# Patient Record
Sex: FEMALE | Race: BLACK OR AFRICAN AMERICAN | Employment: UNEMPLOYED | URBAN - METROPOLITAN AREA
[De-identification: names, ages, dates, MRNs, and addresses within clinical notes are randomized per-mention and may not be internally consistent; named-entity substitution may affect disease eponyms.]

---

## 2018-06-21 ENCOUNTER — HOSPITAL ENCOUNTER (EMERGENCY)
Age: 53
Discharge: HOME | End: 2018-06-21
Attending: EMERGENCY MEDICINE
Payer: MEDICARE

## 2018-06-21 VITALS
BODY MASS INDEX: 22.15 KG/M2 | DIASTOLIC BLOOD PRESSURE: 95 MMHG | WEIGHT: 125 LBS | SYSTOLIC BLOOD PRESSURE: 156 MMHG | TEMPERATURE: 98.8 F | HEART RATE: 83 BPM | HEIGHT: 63 IN | OXYGEN SATURATION: 97 % | RESPIRATION RATE: 16 BRPM

## 2018-06-21 DIAGNOSIS — R51.9 ACUTE NONINTRACTABLE HEADACHE, UNSPECIFIED HEADACHE TYPE: Primary | ICD-10-CM

## 2018-06-21 LAB
-: NORMAL
AMORPHOUS: NORMAL
BACTERIA: NORMAL
BILIRUBIN URINE: NEGATIVE
CASTS UA: NORMAL /LPF (ref 0–2)
CHP ED QC CHECK: YES
COLOR: YELLOW
COMMENT UA: ABNORMAL
CRYSTALS, UA: NORMAL /HPF
EPITHELIAL CELLS UA: NORMAL /HPF (ref 0–5)
GLUCOSE BLD-MCNC: 86 MG/DL
GLUCOSE BLD-MCNC: 86 MG/DL (ref 65–105)
GLUCOSE URINE: NEGATIVE
KETONES, URINE: NEGATIVE
LEUKOCYTE ESTERASE, URINE: ABNORMAL
MUCUS: NORMAL
NITRITE, URINE: NEGATIVE
OTHER OBSERVATIONS UA: NORMAL
PH UA: 6.5 (ref 5–8)
PROTEIN UA: NEGATIVE
RBC UA: NORMAL /HPF (ref 0–2)
RENAL EPITHELIAL, UA: NORMAL /HPF
SPECIFIC GRAVITY UA: 1 (ref 1–1.03)
TRICHOMONAS: NORMAL
TURBIDITY: CLEAR
URINE HGB: NEGATIVE
UROBILINOGEN, URINE: NORMAL
WBC UA: NORMAL /HPF (ref 0–5)
YEAST: NORMAL

## 2018-06-21 PROCEDURE — 81001 URINALYSIS AUTO W/SCOPE: CPT

## 2018-06-21 PROCEDURE — G0382 LEV 3 HOSP TYPE B ED VISIT: HCPCS

## 2018-06-21 PROCEDURE — 82947 ASSAY GLUCOSE BLOOD QUANT: CPT

## 2018-06-21 PROCEDURE — 6370000000 HC RX 637 (ALT 250 FOR IP): Performed by: EMERGENCY MEDICINE

## 2018-06-21 RX ORDER — METRONIDAZOLE 500 MG/1
2000 TABLET ORAL ONCE
Status: DISCONTINUED | OUTPATIENT
Start: 2018-06-21 | End: 2018-06-21

## 2018-06-21 RX ORDER — BUTALBITAL, ACETAMINOPHEN AND CAFFEINE 300; 40; 50 MG/1; MG/1; MG/1
1 CAPSULE ORAL EVERY 6 HOURS PRN
Qty: 6 CAPSULE | Refills: 0 | Status: SHIPPED | OUTPATIENT
Start: 2018-06-21 | End: 2018-07-27 | Stop reason: SDUPTHER

## 2018-06-21 RX ORDER — IBUPROFEN 800 MG/1
800 TABLET ORAL ONCE
Status: COMPLETED | OUTPATIENT
Start: 2018-06-21 | End: 2018-06-21

## 2018-06-21 RX ORDER — IBUPROFEN 800 MG/1
800 TABLET ORAL EVERY 8 HOURS PRN
Qty: 12 TABLET | Refills: 0 | Status: SHIPPED | OUTPATIENT
Start: 2018-06-21 | End: 2018-07-27 | Stop reason: ALTCHOICE

## 2018-06-21 RX ADMIN — IBUPROFEN 800 MG: 800 TABLET ORAL at 17:41

## 2018-06-21 ASSESSMENT — ENCOUNTER SYMPTOMS
SORE THROAT: 0
DIARRHEA: 0
BACK PAIN: 0
SHORTNESS OF BREATH: 0
CONSTIPATION: 0
VOMITING: 0
RHINORRHEA: 0
COUGH: 0
BLOOD IN STOOL: 0
SINUS PRESSURE: 0
NAUSEA: 0
ABDOMINAL PAIN: 0
PHOTOPHOBIA: 0

## 2018-06-21 ASSESSMENT — PAIN SCALES - GENERAL: PAINLEVEL_OUTOF10: 6

## 2018-07-27 ENCOUNTER — OFFICE VISIT (OUTPATIENT)
Dept: INTERNAL MEDICINE | Age: 53
End: 2018-07-27
Payer: MEDICARE

## 2018-07-27 VITALS
BODY MASS INDEX: 21.09 KG/M2 | DIASTOLIC BLOOD PRESSURE: 92 MMHG | HEIGHT: 63 IN | SYSTOLIC BLOOD PRESSURE: 149 MMHG | HEART RATE: 81 BPM | WEIGHT: 119 LBS

## 2018-07-27 DIAGNOSIS — G89.29 CHRONIC LEFT SHOULDER PAIN: ICD-10-CM

## 2018-07-27 DIAGNOSIS — K30 NUD (NONULCER DYSPEPSIA): Primary | ICD-10-CM

## 2018-07-27 DIAGNOSIS — R16.0 HEPATOMEGALY: ICD-10-CM

## 2018-07-27 DIAGNOSIS — F10.10 ALCOHOL ABUSE: ICD-10-CM

## 2018-07-27 DIAGNOSIS — F51.01 PRIMARY INSOMNIA: ICD-10-CM

## 2018-07-27 DIAGNOSIS — F32.A DEPRESSION, UNSPECIFIED DEPRESSION TYPE: ICD-10-CM

## 2018-07-27 DIAGNOSIS — Z00.00 HEALTHCARE MAINTENANCE: ICD-10-CM

## 2018-07-27 DIAGNOSIS — G43.901 MIGRAINE WITH STATUS MIGRAINOSUS, NOT INTRACTABLE, UNSPECIFIED MIGRAINE TYPE: ICD-10-CM

## 2018-07-27 DIAGNOSIS — M25.512 CHRONIC LEFT SHOULDER PAIN: ICD-10-CM

## 2018-07-27 PROCEDURE — 3017F COLORECTAL CA SCREEN DOC REV: CPT | Performed by: STUDENT IN AN ORGANIZED HEALTH CARE EDUCATION/TRAINING PROGRAM

## 2018-07-27 PROCEDURE — 99214 OFFICE O/P EST MOD 30 MIN: CPT | Performed by: INTERNAL MEDICINE

## 2018-07-27 PROCEDURE — 99204 OFFICE O/P NEW MOD 45 MIN: CPT | Performed by: STUDENT IN AN ORGANIZED HEALTH CARE EDUCATION/TRAINING PROGRAM

## 2018-07-27 PROCEDURE — G8427 DOCREV CUR MEDS BY ELIG CLIN: HCPCS | Performed by: STUDENT IN AN ORGANIZED HEALTH CARE EDUCATION/TRAINING PROGRAM

## 2018-07-27 PROCEDURE — 4004F PT TOBACCO SCREEN RCVD TLK: CPT | Performed by: STUDENT IN AN ORGANIZED HEALTH CARE EDUCATION/TRAINING PROGRAM

## 2018-07-27 PROCEDURE — G8420 CALC BMI NORM PARAMETERS: HCPCS | Performed by: STUDENT IN AN ORGANIZED HEALTH CARE EDUCATION/TRAINING PROGRAM

## 2018-07-27 RX ORDER — BUTALBITAL, ACETAMINOPHEN AND CAFFEINE 300; 40; 50 MG/1; MG/1; MG/1
1 CAPSULE ORAL EVERY 6 HOURS PRN
Qty: 6 CAPSULE | Refills: 0 | Status: SHIPPED | OUTPATIENT
Start: 2018-07-27 | End: 2022-06-24

## 2018-07-27 RX ORDER — FAMOTIDINE 10 MG
10 TABLET ORAL 2 TIMES DAILY
Qty: 60 TABLET | Refills: 3 | Status: SHIPPED | OUTPATIENT
Start: 2018-07-27 | End: 2022-09-06 | Stop reason: SDUPTHER

## 2018-07-27 RX ORDER — SERTRALINE HYDROCHLORIDE 25 MG/1
25 TABLET, FILM COATED ORAL DAILY
Qty: 30 TABLET | Refills: 3 | Status: SHIPPED | OUTPATIENT
Start: 2018-07-27 | End: 2022-09-06

## 2018-07-27 RX ORDER — NAPROXEN SODIUM 275 MG/1
275 TABLET ORAL 2 TIMES DAILY WITH MEALS
Qty: 60 TABLET | Refills: 3 | Status: SHIPPED | OUTPATIENT
Start: 2018-07-27 | End: 2022-06-24 | Stop reason: ALTCHOICE

## 2018-07-27 RX ORDER — LANOLIN ALCOHOL/MO/W.PET/CERES
3 CREAM (GRAM) TOPICAL NIGHTLY PRN
Qty: 15 TABLET | Refills: 0 | Status: SHIPPED | OUTPATIENT
Start: 2018-07-27 | End: 2022-07-26 | Stop reason: ALTCHOICE

## 2018-07-27 NOTE — PATIENT INSTRUCTIONS
Return To Clinic 8/24/18. After Visit Summary given and reviewed. bb    It is very important for your care that you keep your appointment. If for some reason you are unable to keep your appointment it is equally important that you call our office at [phone] to cancel your appointment and reschedule. Failure to do so may result in your termination from our practice. Appointment with Dr Keisha Segovia made for next week     LABORATORY INSTRUCTIONS    Your doctor has ordered blood or urine testing. You can get this testing done at the Lab located on the first floor of the [facility], or at any other [facility]. Please stop at Main Registration, before going to the lab, as you must be registered first.     Please get this lab done before your next visit. You may NOT eat, drink, smoke, or chew anything before this test for 8 hours. You may still have water. Script for Xray given to pt. No appt is needed. Please get xray done at a [address] [facility] for Ultrasound faxed to [address] they will call pt for appt. Please call [phone] in not heard within 2 weeks.

## 2018-07-27 NOTE — PROGRESS NOTES
itching. · Ears/Nose/Throat: denies any hearing loss, abnormal smelling or throat pain  · Respiratory: no cough, pleuritic chest pain, dyspnea, or wheezing  · Cardiovascular: no pain, dyspnea on exertion, orthopnea, palpitations, or claudication  · Gastrointestinal: no nausea, vomiting, heartburn, diarrhea, constipation, bloating, or abdominal pain. No bloody or black stools. · Genito-Urinary: no urinary urgency, frequency, dysuria, nocturia, hesitancy, incontinence, or pain. No hematuria  · Musculoskeletal: Left shoulder pain with movements. Restricted range of movement  · Neurologic: no TIA or stroke symptoms. no paralysis, or frequent or severe headaches  · Hematologic/Lymphatic/Immunologic: no abnormal bleeding/bruising, fever, chills, night sweats orswollen glands. · Endocrine: no heat or cold intolerance and no polyuria        PHYSICAL EXAM:      Vitals:    07/27/18 0900   BP: (!) 166/105   Site: Left Arm   Position: Sitting   Cuff Size: Medium Adult   Pulse: 83   Weight: 119 lb (54 kg)   Height: 5' 3\" (1.6 m)     · General appearance: awake, alert, cooperative  · HEENT: Atraumatic, normocephalic, neck supple, normal EOM, thyroid normal, no lymphadenopathy   · Lungs: clear to auscultation bilaterally  · Heart: regular rate and rhythm, S1, S2 normal, no murmur  · Abdomen: soft, non-tender; bowel sounds normal; no masses,  no organomegaly  · Extremities: Left shoulder pain on extension and elevation of arm movement including circumduction and later extension. · Neurological:  Awake, alert, oriented to name, place and time. Cranial nerves II-XII are grossly intact. Reflexes normal and symmetric.  Sensation grossly normal  · Psych-normocephalic now , No suicidal or middle      LABORATORY FINDINGS:    CBC: No results found for: WBC, HGB, PLT  BMP:    Lab Results   Component Value Date    GLUCOSE 86 06/21/2018     Hemoglobin A1C: No results found for: LABA1C  Lipid profile: No results found for: CHOL, TRIG, HDL  Thyroid functions: No results found for: TSH   Hepatic functions: No results found for: ALT, AST, PROT, BILITOT, BILIDIR, LABALBU  ASSESSMENT AND PLAN:   Estephania Mcdonnell was seen today for establish care, health maintenance, urinary frequency and insomnia. Diagnoses and all orders for this visit:    NUD (nonulcer dyspepsia)  -     famotidine (PEPCID) 10 MG tablet; Take 1 tablet by mouth 2 times daily    Chronic left shoulder pain  -     XR SHOULDER LEFT (MIN 2 VIEWS); Future    Primary insomnia  -     melatonin (RA MELATONIN) 3 MG TABS tablet; Take 1 tablet by mouth nightly as needed (insomnia)          -     sertraline (ZOLOFT) 25 MG tablet; Take 1 tablet by mouth daily  Healthcare maintenance  -     Lipid Panel; Future  -     Hepatitis C Antibody; Future  -     HIV Screen; Future  -     CANDACE DIGITAL SCREEN W CAD BILATERAL; Future  -     CBC With Auto Differential; Future  -     Comprehensive Metabolic Panel; Future  -     US LIVER SPLEEN; Future    Migraine with status migrainosus, not intractable, unspecified migraine type  -     butalbital-APAP-caffeine (FIORICET) -40 MG CAPS per capsule; Take 1 capsule by mouth every 6 hours as needed for Headaches    Hepatomegaly  -     US LIVER SPLEEN; Future      Follow up in 4 weeks       INSTRUCTIONS:   Return in about 4 weeks (around 8/24/2018). · Estephania Mcdonnell received counseling on the following healthy behaviors: exercise and tobacco cessation    · Reviewed prior labs and health maintenance. · Discussed use, benefit, and side effects of prescribed medications. Barriers to medication compliance addressed. All patient questions answered. Pt voiced understanding. · Patient given educational materials - see patient instructions      Yari Mathews MD  PGY-, Internal Medicine resident  [facility].     7/27/2018  10:36 AM

## 2018-07-27 NOTE — PROGRESS NOTES
Attending Physician Statement  I have discussed the care of Osmani Lugo, including pertinent history and exam findings with the resident. I have reviewed the key elements of all parts of the encounter with the resident. I have seen and examined the patient with the resident and the key elements of all parts of the encounter have been performed by me. Added history includes poor sleep and some depression sx and she had broken shoulder and has limitied ROM and pain. She is drinker and smoker. Added exam findings include depressed affect and she has hepatomegaly on exam . I agree with the assessment, and status of the problem list as documented. The plan and orders should include advised to quit etoh and started on Zoloft and famotidine and asmked Dr. Giselle Jarrett to see her  Orders Placed This Encounter   Procedures    CANDACE DIGITAL SCREEN W CAD BILATERAL    US LIVER SPLEEN    XR SHOULDER LEFT (MIN 2 VIEWS)    Lipid Panel    Hepatitis C Antibody    HIV Screen    CBC With Auto Differential    Comprehensive Metabolic Panel    and this was also documented by the resident. I agree with the referral to behvioral.The medication list was reviewed with the resident and is up to date. The return visit should be in 4 weeks .     Sergio Rangel

## 2018-11-12 ENCOUNTER — TELEPHONE (OUTPATIENT)
Dept: INTERNAL MEDICINE | Age: 53
End: 2018-11-12

## 2019-04-30 ENCOUNTER — TELEPHONE (OUTPATIENT)
Dept: INTERNAL MEDICINE | Age: 54
End: 2019-04-30

## 2019-04-30 NOTE — LETTER
[address]  [address] [facility] [address] [facility]  Phone: [phone]  Fax: MD Lazaro        April 30, 2019    Naomi Walker  [address] [place] [address] [facility] [address]      Dear Renee Hughes: We are sending this letter because your PCP ordered Ultrasound for you to have done at your last visit here and they have not yet been completed. If you can please come to our office on the 2nd floor to  your orders to have them compelted. If you do not have a follow-up appointment scheduled you can either contact the office to make an appointment with us or you can make one when you come in to pick-up your orders. If you have any questions or concerns, please don't hesitate to call.     Sincerely,        Kenedll Castañeda MD

## 2022-06-21 ENCOUNTER — HOSPITAL ENCOUNTER (EMERGENCY)
Age: 57
Discharge: HOME | End: 2022-06-21
Attending: EMERGENCY MEDICINE
Payer: COMMERCIAL

## 2022-06-21 VITALS
RESPIRATION RATE: 16 BRPM | WEIGHT: 125 LBS | BODY MASS INDEX: 22.15 KG/M2 | HEIGHT: 63 IN | DIASTOLIC BLOOD PRESSURE: 86 MMHG | SYSTOLIC BLOOD PRESSURE: 143 MMHG | TEMPERATURE: 98.4 F | OXYGEN SATURATION: 97 % | HEART RATE: 84 BPM

## 2022-06-21 DIAGNOSIS — R73.9 HYPERGLYCEMIA: ICD-10-CM

## 2022-06-21 DIAGNOSIS — R31.9 URINARY TRACT INFECTION WITH HEMATURIA, SITE UNSPECIFIED: ICD-10-CM

## 2022-06-21 DIAGNOSIS — N39.0 URINARY TRACT INFECTION WITH HEMATURIA, SITE UNSPECIFIED: ICD-10-CM

## 2022-06-21 DIAGNOSIS — K64.4 EXTERNAL HEMORRHOID, BLEEDING: Primary | ICD-10-CM

## 2022-06-21 LAB
-: ABNORMAL
ABSOLUTE EOS #: <0.03 K/UL (ref 0–0.44)
ABSOLUTE IMMATURE GRANULOCYTE: <0.03 K/UL (ref 0–0.3)
ABSOLUTE LYMPH #: 1.91 K/UL (ref 1.1–3.7)
ABSOLUTE MONO #: 0.26 K/UL (ref 0.1–1.2)
ALBUMIN SERPL-MCNC: 3.2 G/DL (ref 3.5–5.2)
ALBUMIN/GLOBULIN RATIO: 1.2 (ref 1–2.5)
ALP BLD-CCNC: 81 U/L (ref 35–104)
ALT SERPL-CCNC: 59 U/L (ref 5–33)
ANION GAP SERPL CALCULATED.3IONS-SCNC: 16 MMOL/L (ref 9–17)
AST SERPL-CCNC: 179 U/L
BASOPHILS # BLD: 1 % (ref 0–2)
BASOPHILS ABSOLUTE: 0.04 K/UL (ref 0–0.2)
BILIRUB SERPL-MCNC: 1.06 MG/DL (ref 0.3–1.2)
BILIRUBIN URINE: ABNORMAL
BUN BLDV-MCNC: 8 MG/DL (ref 6–20)
CALCIUM SERPL-MCNC: 8.3 MG/DL (ref 8.6–10.4)
CASTS UA: ABNORMAL /LPF (ref 0–2)
CASTS UA: ABNORMAL /LPF (ref 0–2)
CHLORIDE BLD-SCNC: 101 MMOL/L (ref 98–107)
CO2: 17 MMOL/L (ref 20–31)
COLOR: ABNORMAL
CREAT SERPL-MCNC: 0.54 MG/DL (ref 0.5–0.9)
EOSINOPHILS RELATIVE PERCENT: 0 % (ref 1–4)
EPITHELIAL CELLS UA: ABNORMAL /HPF (ref 0–5)
GFR AFRICAN AMERICAN: >60 ML/MIN
GFR NON-AFRICAN AMERICAN: >60 ML/MIN
GFR SERPL CREATININE-BSD FRML MDRD: ABNORMAL ML/MIN/{1.73_M2}
GLUCOSE BLD-MCNC: 262 MG/DL (ref 70–99)
GLUCOSE URINE: NEGATIVE
HCT VFR BLD CALC: 33.5 % (ref 36.3–47.1)
HEMOGLOBIN: 11.3 G/DL (ref 11.9–15.1)
IMMATURE GRANULOCYTES: 0 %
KETONES, URINE: ABNORMAL
LEUKOCYTE ESTERASE, URINE: ABNORMAL
LIPASE: 20 U/L (ref 13–60)
LYMPHOCYTES # BLD: 41 % (ref 24–43)
MCH RBC QN AUTO: 34.2 PG (ref 25.2–33.5)
MCHC RBC AUTO-ENTMCNC: 33.7 G/DL (ref 28.4–34.8)
MCV RBC AUTO: 101.5 FL (ref 82.6–102.9)
MONOCYTES # BLD: 6 % (ref 3–12)
MUCUS: ABNORMAL
NITRITE, URINE: NEGATIVE
NRBC AUTOMATED: 0 PER 100 WBC
PDW BLD-RTO: 13.9 % (ref 11.8–14.4)
PH UA: 5.5 (ref 5–8)
PLATELET # BLD: 200 K/UL (ref 138–453)
PMV BLD AUTO: 10.1 FL (ref 8.1–13.5)
POTASSIUM SERPL-SCNC: 4.1 MMOL/L (ref 3.7–5.3)
PROTEIN UA: ABNORMAL
RBC # BLD: 3.3 M/UL (ref 3.95–5.11)
RBC UA: ABNORMAL /HPF (ref 0–2)
REASON FOR REJECTION: NORMAL
SEG NEUTROPHILS: 52 % (ref 36–65)
SEGMENTED NEUTROPHILS ABSOLUTE COUNT: 2.44 K/UL (ref 1.5–8.1)
SODIUM BLD-SCNC: 134 MMOL/L (ref 135–144)
SPECIFIC GRAVITY UA: 1.02 (ref 1–1.03)
TOTAL PROTEIN: 5.9 G/DL (ref 6.4–8.3)
TURBIDITY: ABNORMAL
URINE HGB: ABNORMAL
UROBILINOGEN, URINE: NORMAL
WBC # BLD: 4.7 K/UL (ref 3.5–11.3)
WBC UA: ABNORMAL /HPF (ref 0–5)
ZZ NTE CLEAN UP: ORDERED TEST: NORMAL
ZZ NTE WITH NAME CLEAN UP: SPECIMEN SOURCE: NORMAL

## 2022-06-21 PROCEDURE — 96372 THER/PROPH/DIAG INJ SC/IM: CPT

## 2022-06-21 PROCEDURE — 6370000000 HC RX 637 (ALT 250 FOR IP): Performed by: STUDENT IN AN ORGANIZED HEALTH CARE EDUCATION/TRAINING PROGRAM

## 2022-06-21 PROCEDURE — 85025 COMPLETE CBC W/AUTO DIFF WBC: CPT

## 2022-06-21 PROCEDURE — 6360000002 HC RX W HCPCS: Performed by: STUDENT IN AN ORGANIZED HEALTH CARE EDUCATION/TRAINING PROGRAM

## 2022-06-21 PROCEDURE — 87086 URINE CULTURE/COLONY COUNT: CPT

## 2022-06-21 PROCEDURE — 81001 URINALYSIS AUTO W/SCOPE: CPT

## 2022-06-21 PROCEDURE — 99284 EMERGENCY DEPT VISIT MOD MDM: CPT

## 2022-06-21 PROCEDURE — 80053 COMPREHEN METABOLIC PANEL: CPT

## 2022-06-21 PROCEDURE — 2580000003 HC RX 258: Performed by: STUDENT IN AN ORGANIZED HEALTH CARE EDUCATION/TRAINING PROGRAM

## 2022-06-21 PROCEDURE — 83690 ASSAY OF LIPASE: CPT

## 2022-06-21 RX ORDER — DICYCLOMINE HYDROCHLORIDE 10 MG/1
10 CAPSULE ORAL EVERY 6 HOURS PRN
Qty: 20 CAPSULE | Refills: 0 | Status: SHIPPED | OUTPATIENT
Start: 2022-06-21 | End: 2022-09-06 | Stop reason: SDUPTHER

## 2022-06-21 RX ORDER — DICYCLOMINE HYDROCHLORIDE 10 MG/ML
20 INJECTION INTRAMUSCULAR ONCE
Status: COMPLETED | OUTPATIENT
Start: 2022-06-21 | End: 2022-06-21

## 2022-06-21 RX ORDER — ACETAMINOPHEN 500 MG
1000 TABLET ORAL ONCE
Status: COMPLETED | OUTPATIENT
Start: 2022-06-21 | End: 2022-06-21

## 2022-06-21 RX ORDER — 0.9 % SODIUM CHLORIDE 0.9 %
1000 INTRAVENOUS SOLUTION INTRAVENOUS ONCE
Status: COMPLETED | OUTPATIENT
Start: 2022-06-21 | End: 2022-06-21

## 2022-06-21 RX ORDER — CEPHALEXIN 500 MG/1
500 CAPSULE ORAL 4 TIMES DAILY
Qty: 40 CAPSULE | Refills: 0 | Status: SHIPPED | OUTPATIENT
Start: 2022-06-21 | End: 2022-07-01

## 2022-06-21 RX ORDER — DOCUSATE SODIUM 100 MG/1
100 CAPSULE, LIQUID FILLED ORAL 2 TIMES DAILY
Qty: 60 CAPSULE | Refills: 0 | Status: SHIPPED | OUTPATIENT
Start: 2022-06-21 | End: 2022-07-21

## 2022-06-21 RX ORDER — CEPHALEXIN 500 MG/1
500 CAPSULE ORAL ONCE
Status: COMPLETED | OUTPATIENT
Start: 2022-06-21 | End: 2022-06-21

## 2022-06-21 RX ORDER — HYDROCORTISONE 25 MG/G
CREAM TOPICAL
Qty: 1 EACH | Refills: 0 | Status: SHIPPED | OUTPATIENT
Start: 2022-06-21 | End: 2022-09-06 | Stop reason: SDUPTHER

## 2022-06-21 RX ADMIN — CEPHALEXIN 500 MG: 500 CAPSULE ORAL at 13:47

## 2022-06-21 RX ADMIN — ACETAMINOPHEN 1000 MG: 500 TABLET ORAL at 08:32

## 2022-06-21 RX ADMIN — SODIUM CHLORIDE 1000 ML: 9 INJECTION, SOLUTION INTRAVENOUS at 08:34

## 2022-06-21 RX ADMIN — DICYCLOMINE HYDROCHLORIDE 20 MG: 20 INJECTION INTRAMUSCULAR at 13:17

## 2022-06-21 RX ADMIN — SODIUM CHLORIDE 1000 ML: 9 INJECTION, SOLUTION INTRAVENOUS at 11:05

## 2022-06-21 ASSESSMENT — PAIN SCALES - GENERAL
PAINLEVEL_OUTOF10: 8
PAINLEVEL_OUTOF10: 5

## 2022-06-21 ASSESSMENT — PAIN DESCRIPTION - LOCATION
LOCATION: ABDOMEN
LOCATION: ABDOMEN

## 2022-06-21 ASSESSMENT — PAIN DESCRIPTION - PAIN TYPE: TYPE: ACUTE PAIN

## 2022-06-21 ASSESSMENT — PAIN - FUNCTIONAL ASSESSMENT: PAIN_FUNCTIONAL_ASSESSMENT: 0-10

## 2022-06-21 ASSESSMENT — PAIN DESCRIPTION - FREQUENCY: FREQUENCY: INTERMITTENT

## 2022-06-21 ASSESSMENT — PAIN DESCRIPTION - DESCRIPTORS: DESCRIPTORS: SHARP;SHOOTING

## 2022-06-21 NOTE — ED PROVIDER NOTES
[facility] ED  Emergency Department Encounter  EmergencyMedicine Resident     Pt Name:Mary Beth Kaye  MRN: [id number]  [place] 1965  Date of evaluation: 6/21/22  PCP:  Prasanth Wesley MD    [address] [place] [address]       Chief Complaint   Patient presents with    Rectal Bleeding     x3 days       HISTORY OF PRESENT ILLNESS  (Location/Symptom, Timing/Onset, Context/Setting, Quality, Duration, Modifying Factors, Severity.)      Renato Palacios is a 61 y.o. female who presents with rectal bleeding for 3 days, rectal pain. 5/10 nonradiating sharp. No exacerbating relieving factors. To med student reported constipation however per myself says that she is having loose stools. Patient admittedly does not see a doctor and has no known diagnosis is because of these. Not taking any medications. Denies any recent weight loss. Has never had a colonoscopy. Denies chest pain shortness of breath nausea vomiting fever diarrhea. PAST MEDICAL / SURGICAL / SOCIAL / FAMILY HISTORY      has no past medical history on file. No known     has no past surgical history on file. No known    Social History     Socioeconomic History    Marital status: Single     Spouse name: Not on file    Number of children: Not on file    Years of education: Not on file    Highest education level: Not on file   Occupational History    Not on file   Tobacco Use    Smoking status: Smoker, Current Status Unknown     Packs/day: 0.50    Smokeless tobacco: Never Used   Substance and Sexual Activity    Alcohol use:  Yes     Alcohol/week: 3.0 standard drinks     Types: 3 Cans of beer per week    Drug use: No    Sexual activity: Never   Other Topics Concern    Not on file   Social History Narrative    Not on file     Social Determinants of Health     Financial Resource Strain:     Difficulty of Paying Living Expenses: Not on file   Food Insecurity:     Worried About Running Out of Food in the Last Year: Not on file    [address] in the Last Year: Not on file   Transportation Needs:     Lack of Transportation (Medical): Not on file    Lack of Transportation (Non-Medical): Not on file   Physical Activity:     Days of Exercise per Week: Not on file    Minutes of Exercise per Session: Not on file   Stress:     Feeling of Stress : Not on file   Social Connections:     Frequency of Communication with Friends and Family: Not on file    Frequency of Social Gatherings with Friends and Family: Not on file    Attends [employer] Services: Not on file    Active Member of [address] [place] [address] [employer] or Organizations: Not on file    Attends Club or Organization Meetings: Not on file    Marital Status: Not on file   Intimate Partner Violence:     Fear of Current or Ex-Partner: Not on file    Emotionally Abused: Not on file    Physically Abused: Not on file    Sexually Abused: Not on file   Housing Stability:     Unable to Pay for Housing in the Last Year: Not on file    Number of [place] in the Last Year: Not on file    Unstable Housing in the Last Year: Not on file       No family history on file. Allergies:  Patient has no known allergies. Home Medications:  Prior to Admission medications    Medication Sig Start Date End Date Taking?  Authorizing Provider   hydrocortisone (ANUSOL-HC) 2.5 % CREA rectal cream Apply to rectum twice daily 6/21/22  Yes Jyoti Mills MD   docusate sodium (COLACE) 100 MG capsule Take 1 capsule by mouth 2 times daily 6/21/22 7/21/22 Yes Jyoti Mills MD   dicyclomine (BENTYL) 10 MG capsule Take 1 capsule by mouth every 6 hours as needed (cramps) 6/21/22  Yes Jyoti Mills MD   cephALEXin (KEFLEX) 500 MG capsule Take 1 capsule by mouth 4 times daily for 10 days 6/21/22 7/1/22 Yes Jyoti Mills MD   butalbital-APAP-caffeine (FIORICET) -40 MG CAPS per capsule Take 1 capsule by mouth every 6 hours as needed for Headaches 7/27/18   Nadine Omalley MD   famotidine (PEPCID) 10 MG tablet Take 1 tablet by mouth 2 times daily 7/27/18   Brant Canales MD   melatonin (RA MELATONIN) 3 MG TABS tablet Take 1 tablet by mouth nightly as needed (insomnia) 7/27/18 8/11/18  Brant Canales MD   sertraline (ZOLOFT) 25 MG tablet Take 1 tablet by mouth daily 7/27/18   Brant Canales MD   naproxen sodium (ANAPROX) 275 MG tablet Take 1 tablet by mouth 2 times daily (with meals) 7/27/18   Brant Canales MD       REVIEW OF SYSTEMS    (2-9 systems for level 4, 10 or more for level 5)      Review of Systems   Constitutional: Negative for fever. HENT: Negative for congestion. Eyes: Negative for photophobia. Respiratory: Negative for shortness of breath. Cardiovascular: Negative for chest pain. Gastrointestinal: Positive for rectal bleeding rectal pain  Endocrine: Negative for polyuria. Genitourinary: Negative for dysuria. Musculoskeletal: Negative for arthralgias. Skin: Negative for color change. Allergic/Immunologic: Negative for immunocompromised state. Neurological: Negative for dizziness. Hematological: Does not bruise/bleed easily. Psychiatric/Behavioral: Negative for agitation. PHYSICAL EXAM   (up to 7 for level 4, 8 or more for level 5)      INITIAL VITALS:   BP (!) 143/86   Pulse 84   Temp 98.4 °F (36.9 °C)   Resp 16   Ht 5' 3\" (1.6 m)   Wt 125 lb (56.7 kg)   SpO2 97%   BMI 22.14 kg/m²     Physical Exam  Constitutional:       General: Not in acute distress. Appearance: Normal appearance. Normal weight. Not toxic-appearing. HENT:      Head: Normocephalic and atraumatic. Nose: Nose normal.      Mouth/Throat: Mucous membranes are moist.  Uvula midline no oropharyngeal edema. Pharynx: Oropharynx is clear. Eyes:      Extraocular Movements: Extraocular movements intact. Conjunctiva/sclera: Conjunctivae normal.      Pupils: Pupils are equal, round, and reactive to light. Neck:      Musculoskeletal: Normal range of motion and neck supple.  No neck rigidity. Cardiovascular:      Rate and Rhythm: Normal rate and regular rhythm. Pulses: Normal pulses. Heart sounds: Normal heart sounds. No murmur. Pulmonary:      Effort: Pulmonary effort is normal.      Breath sounds: Normal breath sounds. No wheezing. Abdominal:      General: Abdomen is flat. Bowel sounds are normal.      Tenderness: There is no abdominal tenderness. Musculoskeletal:     Normal range of motion. General: No swelling or tenderness. No LE edema    Skin:     General: Skin is warm. Capillary Refill: Capillary refill takes less than 2 seconds. Coloration: Skin is not jaundiced. Neurological:      General: No focal deficit present. Mental Status: Alert and oriented to person, place, and time. Mental status is at baseline. Motor: No weakness. Rectal: External hemorrhoids nonbleeding no anal fissure. Digital rectal exam unremarkable no palpable mass no gross blood. Hemoccult positive.       DIFFERENTIAL  DIAGNOSIS     PLAN (LABS / IMAGING / EKG):  Orders Placed This Encounter   Procedures    Culture, Urine    CBC with Auto Differential    Urinalysis with Microscopic    Lipase    SPECIMEN REJECTION    Comprehensive Metabolic Panel w/ Reflex to MG    Lipase    PREVIOUS SPECIMEN    Insert peripheral IV       MEDICATIONS ORDERED:  Orders Placed This Encounter   Medications    hydrocortisone (ANUSOL-HC) 2.5 % CREA rectal cream     Sig: Apply to rectum twice daily     Dispense:  1 each     Refill:  0    docusate sodium (COLACE) 100 MG capsule     Sig: Take 1 capsule by mouth 2 times daily     Dispense:  60 capsule     Refill:  0    0.9 % sodium chloride bolus    acetaminophen (TYLENOL) tablet 1,000 mg    0.9 % sodium chloride bolus    dicyclomine (BENTYL) injection 20 mg    dicyclomine (BENTYL) 10 MG capsule     Sig: Take 1 capsule by mouth every 6 hours as needed (cramps)     Dispense:  20 capsule     Refill:  0    cephALEXin (KEFLEX) capsule 500 mg     Order Specific Question:   Antimicrobial Indications     Answer:    Other     Order Specific Question:   Other Abx Indication     Answer:   uti    cephALEXin (KEFLEX) 500 MG capsule     Sig: Take 1 capsule by mouth 4 times daily for 10 days     Dispense:  40 capsule     Refill:  0           DIAGNOSTIC RESULTS / EMERGENCY DEPARTMENT COURSE / MDM     LABS:  Results for orders placed or performed during the hospital encounter of 06/21/22   CBC with Auto Differential   Result Value Ref Range    WBC 4.7 3.5 - 11.3 k/uL    RBC 3.30 (L) 3.95 - 5.11 m/uL    Hemoglobin 11.3 (L) 11.9 - 15.1 g/dL    Hematocrit 33.5 (L) 36.3 - 47.1 %    .5 82.6 - 102.9 fL    MCH 34.2 (H) 25.2 - 33.5 pg    MCHC 33.7 28.4 - 34.8 g/dL    RDW 13.9 11.8 - 14.4 %    Platelets 450 588 - 503 k/uL    MPV 10.1 8.1 - 13.5 fL    NRBC Automated 0.0 0.0 per 100 WBC    Seg Neutrophils 52 36 - 65 %    Lymphocytes 41 24 - 43 %    Monocytes 6 3 - 12 %    Eosinophils % 0 (L) 1 - 4 %    Basophils 1 0 - 2 %    Immature Granulocytes 0 0 %    Segs Absolute 2.44 1.50 - 8.10 k/uL    Absolute Lymph # 1.91 1.10 - 3.70 k/uL    Absolute Mono # 0.26 0.10 - 1.20 k/uL    Absolute Eos # <0.03 0.00 - 0.44 k/uL    Basophils Absolute 0.04 0.00 - 0.20 k/uL    Absolute Immature Granulocyte <0.03 0.00 - 0.30 k/uL   Urinalysis with Microscopic   Result Value Ref Range    Color, UA Orange (A) Yellow    Turbidity UA Cloudy (A) Clear    Glucose, Ur NEGATIVE NEGATIVE    Bilirubin Urine NEGATIVE  Verified by ictotest. (A) NEGATIVE    Ketones, Urine SMALL (A) NEGATIVE    Specific Gravity, UA 1.021 1.005 - 1.030    Urine Hgb LARGE (A) NEGATIVE    pH, UA 5.5 5.0 - 8.0    Protein, UA 1+ (A) NEGATIVE    Urobilinogen, Urine Normal Normal    Nitrite, Urine NEGATIVE NEGATIVE    Leukocyte Esterase, Urine MODERATE (A) NEGATIVE    -          WBC, UA 20 TO 50 0 - 5 /HPF    RBC, UA TOO NUMEROUS TO COUNT 0 - 2 /HPF    Casts UA FINE GRANULAR 0 - 2 /LPF    Casts UA 0 TO 2 0 - 2 /LPF    Epithelial Cells UA 2 TO 5 0 - 5 /HPF    Mucus, UA 1+ (A) None   SPECIMEN REJECTION   Result Value Ref Range    Specimen Source . BLOOD     Ordered Test CMPX LIP     Reason for Rejection Unable to perform testing: Specimen hemolyzed. Comprehensive Metabolic Panel w/ Reflex to MG   Result Value Ref Range    Glucose 262 (H) 70 - 99 mg/dL    BUN 8 6 - 20 mg/dL    CREATININE 0.54 0.50 - 0.90 mg/dL    Calcium 8.3 (L) 8.6 - 10.4 mg/dL    Sodium 134 (L) 135 - 144 mmol/L    Potassium 4.1 3.7 - 5.3 mmol/L    Chloride 101 98 - 107 mmol/L    CO2 17 (L) 20 - 31 mmol/L    Anion Gap 16 9 - 17 mmol/L    Alkaline Phosphatase 81 35 - 104 U/L    ALT 59 (H) 5 - 33 U/L     (H) <32 U/L    Total Bilirubin 1.06 0.3 - 1.2 mg/dL    Total Protein 5.9 (L) 6.4 - 8.3 g/dL    Albumin 3.2 (L) 3.5 - 5.2 g/dL    Albumin/Globulin Ratio 1.2 1.0 - 2.5    GFR Non-African American >60 >60 mL/min    GFR African American >60 >60 mL/min    GFR Comment         Lipase   Result Value Ref Range    Lipase 20 13 - 60 U/L         RADIOLOGY:  None    EKG  None    All EKG's are interpreted by the Emergency Department Physician who either signs or Co-signs this chart in the absence of a cardiologist.    EMERGENCY DEPARTMENT COURSE:  Patient breathing quietly and unlabored on room air. Speech is normal and speaking in full sentences without requiring to pause to take a breath. Hemoccult positive blood on exam no gross blood. External hemorrhoids. No fissure. Patient does not see a physician, scared of doctors and hospitals. Vital signs stable afebrile. Concern for painful external hemorrhoids. Will prescribe Anusol and stool softeners. Given patient's risk and exam findings she also needs to have a colonoscopy done on an outpatient basis. Additionally we will get screening labs to rule out metabolic process, diverticulitis diverticulosis, anemia. Controlled. Only very mild anemia on CBC and metabolic panel glucose is 260s. Likely undiagnosed type 2 diabetes. .  Urinalysis pending. Patient unable to urinate will give IV fluids. Patient still unable to urinate will give additional IV fluids. Unable to urinate will give p.o. fluids    Patient provided urine sample. Requesting pain meds will give Bentyl. Urinalysis positive. Will start Keflex. Patient will follow up with PCP for diabetes, UTI. Will follow with GI for her GI symptoms, screening colonoscopy. We will follow-up with surgery for hemorrhoids. Patient understands and agrees, given return precautions. Prescribing Bentyl docusate Keflex Anusol      PROCEDURES:  None    CONSULTS:  None    CRITICAL CARE:  None    FINAL IMPRESSION      1. External hemorrhoid, bleeding    2. Hyperglycemia    3.  Urinary tract infection with hematuria, site unspecified          DISPOSITION / PLAN     DISPOSITION Decision To Discharge 06/21/2022 01:33:15 PM      PATIENT REFERRED TO:  [address] [place] [address] [address] [facility] [id number] [phone]  Schedule an appointment as soon as possible for a visit in 2 days  external hemorrhoid    [facility]  [address] [facility].  [address] [facility] [address]  Schedule an appointment as soon as possible for a visit today  needs colonoscopy cancer screening, gi bleeding     Jose Alfredo Fink MD  [address] [place] [address] [address] [phone]    Schedule an appointment as soon as possible for a visit in 2 days        DISCHARGE MEDICATIONS:  New Prescriptions    CEPHALEXIN (KEFLEX) 500 MG CAPSULE    Take 1 capsule by mouth 4 times daily for 10 days    DICYCLOMINE (BENTYL) 10 MG CAPSULE    Take 1 capsule by mouth every 6 hours as needed (cramps)    DOCUSATE SODIUM (COLACE) 100 MG CAPSULE    Take 1 capsule by mouth 2 times daily    HYDROCORTISONE (ANUSOL-HC) 2.5 % CREA RECTAL CREAM    Apply to rectum twice daily       Wilma Nascimento MD  Emergency Medicine Resident    (Please note that portions of thisnote were completed with a voice recognition program.  Efforts were made to edit the dictations but occasionally words are mis-transcribed.)       Patricia Messina MD  Resident  06/21/22 [phone]

## 2022-06-21 NOTE — ED PROVIDER NOTES
[address] [facility]     Emergency Department     Faculty Attestation    I performed a history and physical examination of the patient and discussed management with the resident. I reviewed the resident´s note and agree with the documented findings and plan of care. Any areas of disagreement are noted on the chart. I was personally present for the key portions of any procedures. I have documented in the chart those procedures where I was not present during the key portions. I have reviewed the emergency nurses triage note. I agree with the chief complaint, past medical history, past surgical history, allergies, medications, social and family history as documented unless otherwise noted below. For Physician Assistant/ Nurse Practitioner cases/documentation I have personally evaluated this patient and have completed at least one if not all key elements of the E/M (history, physical exam, and MDM). Additional findings are as noted. Abdomen nontender, no guarding or rebounding, no pain at McBurney's point, no pulsatile mass or bruits. Skin warm and dry, no conjunctival pallor. Patient is talkative and states the pain is an 8 out of 10 although she does not appear to be in any discomfort. Patient states that she had a large amount of blood in the toilet 2 days ago.      Olivia Jones MD  06/21/22 [id number]

## 2022-06-21 NOTE — ED NOTES
Labeled blood specimens sent to lab via tube system.     [x] Lavender   [] on ice   [x] Blue   [x] Green/yellow  [] Green/black [] on ice  [] Pink  [] Red  [] Yellow  [] Blood Cultures      Adan Gruber RN  06/21/22 [id number]

## 2022-06-22 LAB
CULTURE: NORMAL
SPECIMEN DESCRIPTION: NORMAL

## 2022-06-24 ENCOUNTER — OFFICE VISIT (OUTPATIENT)
Dept: FAMILY MEDICINE CLINIC | Age: 57
End: 2022-06-24
Payer: COMMERCIAL

## 2022-06-24 VITALS
WEIGHT: 120.4 LBS | SYSTOLIC BLOOD PRESSURE: 137 MMHG | HEART RATE: 74 BPM | DIASTOLIC BLOOD PRESSURE: 88 MMHG | BODY MASS INDEX: 21.33 KG/M2

## 2022-06-24 DIAGNOSIS — Z11.59 NEED FOR HEPATITIS C SCREENING TEST: ICD-10-CM

## 2022-06-24 DIAGNOSIS — Z12.31 ENCOUNTER FOR SCREENING MAMMOGRAM FOR MALIGNANT NEOPLASM OF BREAST: ICD-10-CM

## 2022-06-24 DIAGNOSIS — E11.69 TYPE 2 DIABETES MELLITUS WITH OTHER SPECIFIED COMPLICATION, WITHOUT LONG-TERM CURRENT USE OF INSULIN (HCC): Primary | ICD-10-CM

## 2022-06-24 DIAGNOSIS — Z11.4 ENCOUNTER FOR SCREENING FOR HIV: ICD-10-CM

## 2022-06-24 DIAGNOSIS — Z13.1 SCREENING FOR DIABETES MELLITUS (DM): ICD-10-CM

## 2022-06-24 DIAGNOSIS — D75.89 MACROCYTOSIS: ICD-10-CM

## 2022-06-24 DIAGNOSIS — Z13.220 SCREENING FOR LIPID DISORDERS: ICD-10-CM

## 2022-06-24 DIAGNOSIS — K62.5 BLOOD PER RECTUM: ICD-10-CM

## 2022-06-24 DIAGNOSIS — K64.8 HEMORRHOIDS, INTERNAL: ICD-10-CM

## 2022-06-24 LAB — HBA1C MFR BLD: 8.8 %

## 2022-06-24 PROCEDURE — 3052F HG A1C>EQUAL 8.0%<EQUAL 9.0%: CPT | Performed by: STUDENT IN AN ORGANIZED HEALTH CARE EDUCATION/TRAINING PROGRAM

## 2022-06-24 PROCEDURE — 99213 OFFICE O/P EST LOW 20 MIN: CPT | Performed by: STUDENT IN AN ORGANIZED HEALTH CARE EDUCATION/TRAINING PROGRAM

## 2022-06-24 PROCEDURE — 2022F DILAT RTA XM EVC RTNOPTHY: CPT | Performed by: STUDENT IN AN ORGANIZED HEALTH CARE EDUCATION/TRAINING PROGRAM

## 2022-06-24 PROCEDURE — G8420 CALC BMI NORM PARAMETERS: HCPCS | Performed by: STUDENT IN AN ORGANIZED HEALTH CARE EDUCATION/TRAINING PROGRAM

## 2022-06-24 PROCEDURE — 3017F COLORECTAL CA SCREEN DOC REV: CPT | Performed by: STUDENT IN AN ORGANIZED HEALTH CARE EDUCATION/TRAINING PROGRAM

## 2022-06-24 PROCEDURE — G8427 DOCREV CUR MEDS BY ELIG CLIN: HCPCS | Performed by: STUDENT IN AN ORGANIZED HEALTH CARE EDUCATION/TRAINING PROGRAM

## 2022-06-24 PROCEDURE — 4004F PT TOBACCO SCREEN RCVD TLK: CPT | Performed by: STUDENT IN AN ORGANIZED HEALTH CARE EDUCATION/TRAINING PROGRAM

## 2022-06-24 PROCEDURE — 83036 HEMOGLOBIN GLYCOSYLATED A1C: CPT | Performed by: STUDENT IN AN ORGANIZED HEALTH CARE EDUCATION/TRAINING PROGRAM

## 2022-06-24 SDOH — ECONOMIC STABILITY: FOOD INSECURITY: WITHIN THE PAST 12 MONTHS, THE FOOD YOU BOUGHT JUST DIDN'T LAST AND YOU DIDN'T HAVE MONEY TO GET MORE.: NEVER TRUE

## 2022-06-24 SDOH — ECONOMIC STABILITY: FOOD INSECURITY: WITHIN THE PAST 12 MONTHS, YOU WORRIED THAT YOUR FOOD WOULD RUN OUT BEFORE YOU GOT MONEY TO BUY MORE.: NEVER TRUE

## 2022-06-24 ASSESSMENT — ENCOUNTER SYMPTOMS
BACK PAIN: 0
BLOOD IN STOOL: 1
VOMITING: 0
NAUSEA: 0
SINUS PRESSURE: 0
CONSTIPATION: 0
SHORTNESS OF BREATH: 0
WHEEZING: 0
DIARRHEA: 0
COUGH: 0
ABDOMINAL PAIN: 0
COLOR CHANGE: 0
SINUS PAIN: 0

## 2022-06-24 ASSESSMENT — PATIENT HEALTH QUESTIONNAIRE - PHQ9
1. LITTLE INTEREST OR PLEASURE IN DOING THINGS: 1
SUM OF ALL RESPONSES TO PHQ QUESTIONS 1-9: 2
SUM OF ALL RESPONSES TO PHQ QUESTIONS 1-9: 2
SUM OF ALL RESPONSES TO PHQ9 QUESTIONS 1 & 2: 2
SUM OF ALL RESPONSES TO PHQ QUESTIONS 1-9: 2
2. FEELING DOWN, DEPRESSED OR HOPELESS: 1
SUM OF ALL RESPONSES TO PHQ QUESTIONS 1-9: 2

## 2022-06-24 ASSESSMENT — SOCIAL DETERMINANTS OF HEALTH (SDOH): HOW HARD IS IT FOR YOU TO PAY FOR THE VERY BASICS LIKE FOOD, HOUSING, MEDICAL CARE, AND HEATING?: NOT HARD AT ALL

## 2022-06-24 NOTE — PROGRESS NOTES
Visit Information    Have you changed or started any medications since your last visit including any over-the-counter medicines, vitamins, or herbal medicines? no   Are you having any side effects from any of your medications? -  no  Have you stopped taking any of your medications? Is so, why? -  no    Have you seen any other physician or provider since your last visit? Yes - Records Requested  Have you had any other diagnostic tests since your last visit? Yes - Records Requested  Have you been seen in the emergency room and/or had an admission to a hospital since we last saw you? Yes - Records Requested  Have you had your routine dental cleaning in the past 6 months? no    Have you activated your [employer] account? If not, what are your barriers?  No:      Patient Care Team:  Penny Rivera MD as PCP - General (Internal Medicine)    Medical History Review  Past Medical, Family, and Social History reviewed and does not contribute to the patient presenting condition    Health Maintenance   Topic Date Due    Depression Screen  Never done    HIV screen  Never done    Hepatitis C screen  Never done    DTaP/Tdap/Td vaccine (1 - Tdap) Never done    Cervical cancer screen  Never done    Lipids  Never done    Colorectal Cancer Screen  Never done    Pneumococcal 0-64 years Vaccine (2 - PCV) 04/06/2013    Breast cancer screen  Never done    Shingles vaccine (1 of 2) Never done    COVID-19 Vaccine (2 - Booster for Mirego series) 02/01/2022    Flu vaccine (Season Ended) 09/01/2022    Hepatitis A vaccine  Aged Out    Hepatitis B vaccine  Aged Out    Hib vaccine  Aged Out    Meningococcal (ACWY) vaccine  Aged Out

## 2022-06-24 NOTE — PATIENT INSTRUCTIONS
Thank you for letting us take care of you today. We hope all your questions were addressed. If a question was overlooked or something else comes to mind after you return home, please contact a member of your Care Team listed below. Your Care Team at [place] [address] is Team #2  Yomaira Sarmiento DO (Faculty)  Wenceslao Ramachandran (Faculty)  Noelle Junior MD (Resident)  Shayy Ramirez MD (Resident)  Greer Miner MD (Resident)  Jesus Manuel Smith MD (Resident)  Nely Cadet., RILEY Blanca.,  RUBEN Conley., ELPIDIO Ford, [facility] office)  Truman Palomares, [address] (Clinical Practice Manager)  Juan Antonio Carrasco [facility] (Clinical Pharmacist)     Office phone number: [phone]    If you need to get in right away due to illness, please be advised we have \"Same Day\" appointments available Monday-Friday. Please call us at [phone] option #3 to schedule your \"Same Day\" appointment. Patient Education        Learning About Meal Planning for Diabetes  Why plan your meals? Meal planning can be a key part of managing diabetes. Planning meals and snacks with the right balance of carbohydrate, protein, and fat can help you keep yourblood sugar at the target level you set with your doctor. You don't have to eat special foods. You can eat what your family eats, including sweets once in a while. But you do have to pay attention to how oftenyou eat and how much you eat of certain foods. You may want to work with a dietitian or a diabetes educator. They can give you tips and meal ideas and can answer your questions about meal planning. This health professional can also help you reach a healthy weight if that is one ofyour goals. What plan is right for you? Your dietitian or diabetes educator may suggest that you start with the plateformat or carbohydrate counting. The plate format  The plate format is a simple way to help you manage how you eat.  You plan meals by learning how much space each food should take on a plate. Using the plate format helps you manage the amount of carbohydrate you eat. It can make it easier to keep your blood sugar level within your target range. It also helpsyou see if you're eating healthy portion sizes. To use the plate format, you put non-starchy vegetables on half your plate. Add lean protein foods, such as fish, lean meats and poultry, or soy products, on one-quarter of the plate. Put a grain or starchy vegetable (such as brown rice or a potato) on the final quarter of the plate. You can add a small piece of fruit and some low-fat or fat-free milk or yogurt, depending on yourcarbohydrate goal for each meal.  Here are some tips for using the plate format:   Make sure that you are not using an oversized plate. A 9-inch plate is best. Many restaurants use larger plates.  Get used to using the plate format at home. Then you can use it when you eat out.  Write down your questions about using the plate format. Talk to your doctor, a dietitian, or a diabetes educator about your concerns. Carbohydrate counting  With carbohydrate counting, you plan meals based on the amount of carbohydrate in each food. Carbohydrate raises blood sugar higher and more quickly than any other nutrient. It is found in desserts, breads and cereals, and fruit. It's also found in starchy vegetables such as potatoes and corn, grains such as rice and pasta, and milk and yogurt. You can help keep your blood sugar levels within your target range by planning how much carbohydrate to have at meals andsnacks. The amount you need depends on several things. These include your weight, how active you are, which diabetes medicines you take, and what your goals are for your blood sugar levels. A registered dietitian or diabetes educator can helpyou plan how much carbohydrate to include in each meal and snack.   An example of a carbohydrate counting plan is:   45 to 60 grams at each meal. That's about the same as 3 to 4 carbohydrate servings.  15 to 20 grams at each snack. That's about the same as 1 carbohydrate serving. The Nutrition Facts label on packaged foods tells you how much carbohydrate is in a serving of the food. First, look at the serving size on the food label. Is that the amount you eat in a serving? All of the nutrition information on a food label is based on that serving size. So if you eat more or less than that, you'll need to adjust the other numbers. Total carbohydrate is the next thing you need to look for on the label. If you count carbohydrate servings, oneserving of carbohydrate is 15 grams. For foods that don't come with labels, such as fresh fruits and vegetables, you'll need a guide that lists carbohydrate in these foods. Ask your doctor, dietitian, or diabetes educator about books or other nutrition guides you canuse. If you take insulin, you need to know how many grams of carbohydrate are in a meal. This lets you know how much rapid-acting insulin to take before you eat. If you use an insulin pump, you get a constant rate of insulin during the day. So the pump must be programmed at meals to give you extra insulin to cover therise in blood sugar after meals. When you know how much carbohydrate you will eat, you can take the right amount of insulin. Or, if you always use the same amount of insulin, you need to [facility] that you eat the same amount of carbohydrate at meals. If you need more help to understand carbohydrate counting and food labels, askyour doctor, dietitian, or diabetes educator. How can you plan healthy meals? Here are some tips to get started:  [facility] your meals a week at a time. Don't forget to include snacks too.  Use cookbooks or online recipes to plan several main meals. Plan some quick meals for busy nights. You also can double some recipes that freeze well. Then you can save half for other busy nights when you don't have time to cook.    Make sure you have the ingredients you need for your recipes. If you're running low on basic items, put these items on your shopping list too.  List foods that you use to make breakfasts, lunches, and snacks. List plenty of fruits and vegetables.  Post this list on the refrigerator. Add to it as you think of more things you need.  Take the list to the store to do your weekly shopping. Follow-up care is a key part of your treatment and safety. Be sure to make and go to all appointments, and call your doctor if you are having problems. It's also a good idea to know your test results and keep alist of the medicines you take. Where can you learn more? Go to https://[email or web address] Servicespepiceweb.InPlace. org and sign in to your [employer] account. Enter K936 in the [employer] box to learn more about \"Learning About Meal Planning for Diabetes. \"     If you do not have an account, please click on the \"Sign Up Now\" link. Current as of: September 8, 2021               Content Version: 13.3  © 2006-2022 Healthwise, [employer]. Care instructions adapted under license by [facility] ([facility]). If you have questions about a medical condition or this instruction, always ask your healthcare professional. [address] any warranty or liability for your use of this information. Patient Education        Diabetes Blood Sugar Emergencies: Your Action Plan  How can you prevent a blood sugar emergency? An important part of living with diabetes is keeping your blood sugar in your target range. You'll need to know what to do if it's too high or too low. Managing your blood sugar levels helps you avoid emergencies. This care sheet will teach you about the signs of high and low blood sugar. It will help youmake an action plan with your doctor for when these signs occur. Low blood sugar is more likely to happen if you take certain medicines for diabetes.  It canalso happen if you skip a meal, drink alcohol, or exercise more than usual.  You may get high blood sugar if you eat differently than you normally do. One example is eating more carbohydrate than usual. Having a cold, the flu, or other sudden illness can also cause high blood sugar levels. Levels can also rise if you miss a dose ofmedicine. Any change in how you take your medicine may affect your blood sugar level. Soit's important to work with your doctor before you make any changes. Track your blood sugar  Work with your doctor to fill in the blank spaces below that apply to you. Track your levels, know your target range, and write down ways you can get your blood sugar back in your target range. A log book can help you track yourlevels. Take the book to all of your medical appointments.  Check your blood sugar _____ times a day, at these times:________________________________________________. (For example: Before meals, at bedtime, before exercise, during exercise, other.)   Your blood sugar target range before a meal is ___________________. Your blood sugar target range after a meal is _______________________.  Do this--___________________________________________________--to get your blood sugar back within your safe range if your blood sugar results are _________________________________________. (For example: Less than 70 or above 250 mg/dL.)  Call your doctor when your blood sugar results are ___________________________________. (For example: Less than 70 or above 250 mg/dL.)  What are the symptoms of low and high blood sugar? Common symptoms of low blood sugar are sweating and feeling shaky, weak, hungry, or confused. Symptoms can startquickly. Common symptoms of high blood sugar are feeling very thirsty or very hungry. You may also pass urine more oftenthan usual. You may have blurry vision and may lose weight without trying. But some people may have high or low blood sugar without having any symptoms. That's a good reason to check your blood sugar on a regular schedule. What should you do if you have symptoms? Work with your doctor to fill in the blank spaces below that apply to you. Low blood sugar and \"the rule of 15\"  If you have symptoms of low blood sugar, check your blood sugar. If it's below _____ ( for example, below 70), eat or drink a quick-sugar food that has about 15 grams of carbohydrate. Your goal is to get your level back to your safe range. Check your blood sugar again 15 minutes later. If it's still not in your target range, take another 15 grams of carbohydrate and check your blood sugar again in 15 minutes. Repeatthis until you reach your target. Then go back to your regular testing schedule. Children usually need less than 15 grams of carbohydrate. Check with yourdoctor or diabetes educator for the amount that is right for your child. When you have low blood sugar, it's best to stop or reduce any physical activity until your blood sugar is back in your target range and is stable. If you must stay active, eat or drink 30 grams of carbohydrate. Then check your blood sugar again in 15 minutes. If it's not in your target range, take another 30 grams of carbohydrates. Check your blood sugar again in 15 minutes. Keep doing this until you reach your target. You can then go back to your regulartesting schedule. If your symptoms or blood sugar levels are getting worse or have not improved after 15 minutes, seek medical care right away. If you take insulin, always carry a glucagon emergency kit. Be sure your family, friends, and coworkers know how to give glucagon. Here are some examples of quick-sugar foods with 15 grams of carbohydrate:   3 or 4 glucose tablets   1 tablespoon (3 teaspoons) table sugar   ½ cup to ¾ cup (4 to 6 ounces) of fruit juice or regular (not diet) soda   Hard candy (such as 6 Life Savers)  High blood sugar  If you have symptoms of high blood sugar, check your blood sugar.  Your goal is to get your level back to your target range. If it's above ______ ( for example, above 250), follow these steps:   If you missed a dose of your diabetes medicine, take it now. Take only the amount of medicine that you have been prescribed. Do not take more or less medicine.  Give yourself insulin if your doctor has prescribed it for high blood sugar.  Test for ketones, if the doctor told you to do so. If the results of the ketone test show a moderate-to-large amount of ketones, call the doctor for advice.  Wait 30 minutes after you take the extra insulin or the missed medicine. Check your blood sugar again. If your symptoms or blood sugar levels are getting worse or have not improved after taking these steps, seek medical care right away. Follow-up care is a key part of your treatment and safety. Be sure to make and go to all appointments, and call your doctor if you are having problems. It's also a good idea to know your test results and keep alist of the medicines you take. Where can you learn more? Go to https://Vanu Coverage.Wakie. org and sign in to your [employer] account. Enter V047 in the [facility] box to learn more about \"Diabetes Blood Sugar Emergencies: Your Action Plan. \"     If you do not have an account, please click on the \"Sign Up Now\" link. Current as of: July 28, 2021               Content Version: 13.3  © 9475-3682 Healthwise, Incorporated. Care instructions adapted under license by [facility] ([facility]). If you have questions about a medical condition or this instruction, always ask your healthcare professional. [place] [address] any warranty or liability for your use of this information. Patient Education        Learning About Carbohydrate (Carb) Counting and Eating Out When You Have Diabetes  Why plan your meals? Meal planning can be a key part of managing diabetes.  Planning meals and snacks with the right balance of carbohydrate, protein, and fat can help you keep yourblood sugar at the target level you set with your doctor. You don't have to eat special foods. You can eat what your family eats, including sweets once in a while. But you do have to pay attention to how oftenyou eat and how much you eat of certain foods. You may want to work with a dietitian or a diabetes educator. They can give you tips and meal ideas and can answer your questions about meal planning. This health professional can also help you reach a healthy weight if that is one ofyour goals. What should you know about eating carbs? Managing the amount of carbohydrate (carbs) you eat is an important part ofhealthy meals when you have diabetes. Carbohydrate is found in many foods.  Learn which foods have carbs. And learn the amounts of carbs in different foods. ? Bread, cereal, pasta, and rice have about 15 grams of carbs in a serving. A serving is 1 slice of bread (1 ounce), ½ cup of cooked cereal, or 1/3 cup of cooked pasta or rice. ? Fruits have 15 grams of carbs in a serving. A serving is 1 small fresh fruit, such as an apple or orange; ½ of a banana; ½ cup of cooked or canned fruit; ½ cup of fruit juice; 1 cup of melon or raspberries; or 2 tablespoons of dried fruit. ? Milk and no-sugar-added yogurt have 15 grams of carbs in a serving. A serving is 1 cup of milk or 3/4 cup (6 oz) of no-sugar-added yogurt. ? Starchy vegetables have 15 grams of carbs in a serving. A serving is ½ cup of mashed potatoes or sweet potato; 1 cup winter squash; ½ of a small baked potato; ½ cup of cooked beans; or ½ cup cooked corn or green peas.  Learn how much carbs to eat each day and at each meal. A dietitian or certified diabetes educator can teach you how to keep track of the amount of carbs you eat. This is called carbohydrate counting.  If you are not sure how to count carbohydrate grams, use the plate method to plan meals.  It is a quick way to make sure that you have a balanced meal. It also can help you manage the amount of carbohydrate you eat at meals. ? Divide your plate by types of foods. Put non-starchy vegetables on half the plate, meat or other protein food on one-quarter of the plate, and a grain or starchy vegetable in the final quarter of the plate. To this you can add a small piece of fruit and 1 cup of milk or yogurt, depending on how many carbs you are supposed to eat at a meal.   Try to eat about the same amount of carbs at each meal. Do not \"save up\" your daily allowance of carbs to eat at one meal.   Proteins have very little or no carbs. Examples of proteins are beef, chicken, turkey, fish, eggs, tofu, cheese, cottage cheese, and peanut butter. How can you eat out and still eat healthy?  Learn to estimate the serving sizes of foods that have carbohydrate. If you measure food at home, it will be easier to estimate the amount in a serving of restaurant food.  If the meal you order has too much carbohydrate (such as potatoes, corn, or baked beans), ask to have a low-carbohydrate food instead. Ask for a salad or non-starchy vegetables like broccoli, cauliflower, green beans, or peppers.  If you eat more carbohydrate at a meal than you had planned, take a walk or do other exercise. This will help lower your blood sugar. What are some tips for eating healthy?  Limit saturated fat, such as the fat from meat and dairy products. This is a healthy choice because people who have diabetes are at higher risk of heart disease. So choose lean cuts of meat and nonfat or low-fat dairy products. Use olive or canola oil instead of butter or shortening when cooking.  Don't skip meals. Your blood sugar may drop too low if you skip meals and take insulin or certain medicines for diabetes.  Check with your doctor before you drink alcohol. Alcohol can cause your blood sugar to drop too low. Alcohol can also cause a bad reaction if you take certain diabetes medicines.   Follow-up care is a key part of your treatment and safety. Be sure to make and go to all appointments, and call your doctor if you are having problems. It's also a good idea to know your test results and keep alist of the medicines you take. Where can you learn more? Go to https://ehsan.Profitero. org and sign in to your [employer] account. Enter S440 in the [employer] box to learn more about \"Learning About Carbohydrate (Carb) Counting and Eating Out When You Have Diabetes. \"     If you do not have an account, please click on the \"Sign Up Now\" link. Current as of: September 8, 2021               Content Version: 13.3  © 3823-3878 Healthwise, Incorporated. Care instructions adapted under license by [facility] ([facility]). If you have questions about a medical condition or this instruction, always ask your healthcare professional. [address] any warranty or liability for your use of this information.

## 2022-06-24 NOTE — PROGRESS NOTES
[place]  Subjective:    Oriana Gomez is a 61 y.o. female with  has no past medical history on file. Presented to the office today for:  Chief Complaint   Patient presents with    Diabetes     ed follow up       HPI  This is a 13-year-old female with a history of hemorrhoids came in today after hospital follow-up. Cc: Bleeding per rectum  Patient endorses that she does have a history of hemorrhoids for the last 6 7 years, patient has never evaluated for internal versus external hemorrhoids. Per patient she is experiencing bleeding for the last couple of days to the point that she went to the ED for further evaluation. Patient was constipated per the ED note at that time CBC was checked her hemoglobin was around 11. Patient does not have a family history of colon cancer, is not losing weight, patient said that she is having bowel movements every day. Patient is on antibiotic because she was diagnosed with a UTI during that encounter. Review of Systems   Constitutional: Negative for chills and fever. HENT: Negative for congestion, sinus pressure and sinus pain. Respiratory: Negative for cough, shortness of breath and wheezing. Cardiovascular: Negative for chest pain, palpitations and leg swelling. Gastrointestinal: Positive for blood in stool. Negative for abdominal pain, constipation, diarrhea, nausea and vomiting. Genitourinary: Negative for difficulty urinating, flank pain and hematuria. Musculoskeletal: Negative for arthralgias, back pain and myalgias. Skin: Negative for color change and wound. Neurological: Negative for dizziness, light-headedness and headaches. Psychiatric/Behavioral: Negative for agitation and confusion. ROS negative except what mentioned in HPI. The patient has a No family history on file.     Objective:    /88 (Site: Left Upper Arm, Position: Sitting, Cuff Size: Small Adult)   Pulse 74   Wt 120 lb 6.4 oz (54.6 kg)   BMI 21.33 kg/m²    BP Readings from Last 3 Encounters:   06/24/22 137/88   06/21/22 (!) 143/86   07/27/18 (!) 149/92       Physical Exam  Vitals and nursing note reviewed. Constitutional:       Appearance: Normal appearance. HENT:      Mouth/Throat:      Mouth: Mucous membranes are moist.   Eyes:      Conjunctiva/sclera: Conjunctivae normal.   Cardiovascular:      Rate and Rhythm: Normal rate and regular rhythm. Pulmonary:      Effort: Pulmonary effort is normal.      Breath sounds: Normal breath sounds. Abdominal:      General: Bowel sounds are normal. There is no distension. Palpations: Abdomen is soft. There is no mass. Tenderness: There is no abdominal tenderness. There is no guarding. Musculoskeletal:      Right lower leg: No edema. Left lower leg: No edema. Skin:     Coloration: Skin is not pale. Neurological:      General: No focal deficit present. Mental Status: She is alert and oriented to person, place, and time. Psychiatric:         Mood and Affect: Mood normal.         Behavior: Behavior normal.         Lab Results   Component Value Date    WBC 4.7 06/21/2022    HGB 11.3 (L) 06/21/2022    HCT 33.5 (L) 06/21/2022     06/21/2022    ALT 59 (H) 06/21/2022     (H) 06/21/2022     (L) 06/21/2022    K 4.1 06/21/2022     06/21/2022    CREATININE 0.54 06/21/2022    BUN 8 06/21/2022    CO2 17 (L) 06/21/2022     Lab Results   Component Value Date    CALCIUM 8.3 (L) 06/21/2022     No results found for: LDLCALC, LDLCHOLESTEROL, LDLDIRECT    Examination including this and mentioned above in HPI. Assessment and Plan:    1. Hemorrhoids, internal  -Most likely internal hemorrhoid as patient. Bleeding is painless. Patient will be referred to [facility] office for further management. Patient to have medication which she is taking for constipation, patient also need screening colonoscopy. - [facility]    2.  Type 2 DM:   - HbA1c: 8.8 (T)  -We will start patient on metformin 500 Mg and escalate therapy as needed. We will see the patient in 4 weeks and probably increase metformin to 1000 mg twice daily. Went through the major side effect including nausea, vomiting and abdominal pain. 3. Macrocytosis  -Hemoglobin: 11.3, MCV: 101, patient do endorse drinking alcohol. Will rule out vitamin B12 and folate deficiency. - Vitamin B12 & Folate; Future    4. Screening for lipid disorders  - Lipid Panel; Future    5. Patient declined HIV and hepatitis C screening. Patient will be provided with mammogram as patient do have family history of breast cancer. Requested Prescriptions      No prescriptions requested or ordered in this encounter       Medications Discontinued During This Encounter   Medication Reason    butalbital-APAP-caffeine (FIORICET) -40 MG CAPS per capsule LIST CLEANUP    naproxen sodium (ANAPROX) 275 MG tablet Therapy completed       Mary Beth received counseling on the following healthy behaviors: nutrition, exercise and medication adherence      Patient was counseled about importance of taking medication which were prescribed today and also which he is already using during today conversation. Discussed use,benefit, and side effects of prescribed medications. Barriers to medication compliance addressed. Patient was also counseled that lifestyle changes including diet, smoking and use of less alcohol will benefit their health in long term. All the question asked by the patient were answered in non-medical terms and to be best of writer knowledge. Patient understands and agree to the plan. Teach back method was used while having the conversation. All patient questions answered. Pt voiced understanding. No follow-ups on file. Disclaimer: Some oral of this note was transcribed using voice-recognition software. This may cause typographical errors occasionally, please review it with the context of the note.  Although all effort is made to fix these errors, please do not hesitate to contact our office if there Mary Beam concern with the understanding of this note.

## 2022-07-06 ENCOUNTER — OFFICE VISIT (OUTPATIENT)
Dept: SURGERY | Age: 57
End: 2022-07-06
Payer: COMMERCIAL

## 2022-07-06 VITALS
WEIGHT: 124.4 LBS | DIASTOLIC BLOOD PRESSURE: 86 MMHG | HEIGHT: 63 IN | BODY MASS INDEX: 22.04 KG/M2 | SYSTOLIC BLOOD PRESSURE: 122 MMHG | OXYGEN SATURATION: 97 % | HEART RATE: 78 BPM | TEMPERATURE: 96.8 F

## 2022-07-06 DIAGNOSIS — K62.5 RECTAL BLEEDING: Primary | ICD-10-CM

## 2022-07-06 PROCEDURE — 3017F COLORECTAL CA SCREEN DOC REV: CPT | Performed by: STUDENT IN AN ORGANIZED HEALTH CARE EDUCATION/TRAINING PROGRAM

## 2022-07-06 PROCEDURE — 4004F PT TOBACCO SCREEN RCVD TLK: CPT | Performed by: STUDENT IN AN ORGANIZED HEALTH CARE EDUCATION/TRAINING PROGRAM

## 2022-07-06 PROCEDURE — 99203 OFFICE O/P NEW LOW 30 MIN: CPT | Performed by: STUDENT IN AN ORGANIZED HEALTH CARE EDUCATION/TRAINING PROGRAM

## 2022-07-06 PROCEDURE — G8420 CALC BMI NORM PARAMETERS: HCPCS | Performed by: STUDENT IN AN ORGANIZED HEALTH CARE EDUCATION/TRAINING PROGRAM

## 2022-07-06 PROCEDURE — G8427 DOCREV CUR MEDS BY ELIG CLIN: HCPCS | Performed by: STUDENT IN AN ORGANIZED HEALTH CARE EDUCATION/TRAINING PROGRAM

## 2022-07-06 RX ORDER — POLYETHYLENE GLYCOL 3350
POWDER (GRAM) MISCELLANEOUS
Qty: 1 EACH | Refills: 0 | Status: SHIPPED | OUTPATIENT
Start: 2022-07-06 | End: 2022-09-06

## 2022-07-06 NOTE — PROGRESS NOTES
Visit Information    Have you changed or started any medications since your last visit including any over-the-counter medicines, vitamins, or herbal medicines? no   Have you stopped taking any of your medications? Is so, why? -  no  Are you having any side effects from any of your medications? - no    Have you seen any other physician or provider since your last visit?  no   Have you had any other diagnostic tests since your last visit?  no   Have you been seen in the emergency room and/or had an admission in a hospital since we last saw you?  no   Have you had your routine dental cleaning in the past 6 months?  no     Do you have an active MyChart account? If no, what is the barrier?   No: Pending     Patient Care Team:  Griselda Balderas MD as PCP - General (Emergency Medicine)    Medical History Review  Past Medical, Family, and Social History reviewed and does not contribute to the patient presenting condition    Health Maintenance   Topic Date Due    Diabetic foot exam  Never done    Lipids  Never done    HIV screen  Never done    Diabetic microalbuminuria test  Never done    Diabetic retinal exam  Never done    Hepatitis C screen  Never done    DTaP/Tdap/Td vaccine (1 - Tdap) Never done    Cervical cancer screen  Never done    Colorectal Cancer Screen  Never done    Pneumococcal 0-64 years Vaccine (2 - PCV) 04/06/2013    Breast cancer screen  Never done    Shingles vaccine (1 of 2) Never done    COVID-19 Vaccine (2 - Booster for Toshia series) 02/01/2022    Flu vaccine (1) 09/01/2022    A1C test (Diabetic or Prediabetic)  06/24/2023    Depression Screen  06/24/2023    Hepatitis A vaccine  Aged Out    Hepatitis B vaccine  Aged Out    Hib vaccine  Aged Out    Meningococcal (ACWY) vaccine  Aged Out

## 2022-07-06 NOTE — PROGRESS NOTES
History and Physical  [facility]    Patient's Name/Date of Birth: Arminda Meeks 1965 (34 y.o.)    Date: 2022     HPI: Pt is a 61 y.o. female with history of diabetes who presents for evaluation of rectal bleeding. She states that on and off for the last year she has had intermittent, painless rectal bleeding and fullness. She states that for the last 2-3 weeks she has had some burning pain and itching as well. She has been using anusol cream with improvement. Denies any previous colonoscopy in the past. No weight loss Reports family hx of cancer but unsure what type. She has hx of  section. PMH:  Diabetes  Depression    PSH:      Current Outpatient Medications   Medication Sig Dispense Refill    metFORMIN (GLUCOPHAGE) 500 MG tablet Take 1 tablet by mouth 2 times daily (with meals) 180 tablet 1    hydrocortisone (ANUSOL-HC) 2.5 % CREA rectal cream Apply to rectum twice daily 1 each 0    docusate sodium (COLACE) 100 MG capsule Take 1 capsule by mouth 2 times daily 60 capsule 0    dicyclomine (BENTYL) 10 MG capsule Take 1 capsule by mouth every 6 hours as needed (cramps) 20 capsule 0    famotidine (PEPCID) 10 MG tablet Take 1 tablet by mouth 2 times daily 60 tablet 3    sertraline (ZOLOFT) 25 MG tablet Take 1 tablet by mouth daily 30 tablet 3    melatonin (RA MELATONIN) 3 MG TABS tablet Take 1 tablet by mouth nightly as needed (insomnia) 15 tablet 0     No current facility-administered medications for this visit. No Known Allergies    No family history on file.     Social History     Socioeconomic History    Marital status: Single     Spouse name: Not on file    Number of children: Not on file    Years of education: Not on file    Highest education level: Not on file   Occupational History    Not on file   Tobacco Use    Smoking status: Smoker, Current Status Unknown     Packs/day: 0.50    Smokeless tobacco: Never Used   Substance and Sexual Activity  Alcohol use: Yes     Alcohol/week: 3.0 standard drinks     Types: 3 Cans of beer per week    Drug use: No    Sexual activity: Never   Other Topics Concern    Not on file   Social History Narrative    Not on file     Social Determinants of Health     Financial Resource Strain: Low Risk     Difficulty of Paying Living Expenses: Not hard at all   Food Insecurity: No Food Insecurity    Worried About Running Out of Food in the Last Year: Never true    [address] in the Last Year: Never true   Transportation Needs:     Lack of Transportation (Medical): Not on file    Lack of Transportation (Non-Medical): Not on file   Physical Activity:     Days of Exercise per Week: Not on file    Minutes of Exercise per Session: Not on file   Stress:     Feeling of Stress : Not on file   Social Connections:     Frequency of Communication with Friends and Family: Not on file    Frequency of Social Gatherings with Friends and Family: Not on file    Attends Rastafarian Services: Not on file    Active Member of [address] [place] [address] or Organizations: Not on file    Attends Club or Organization Meetings: Not on file    Marital Status: Not on file   Intimate Partner Violence:     Fear of Current or Ex-Partner: Not on file    Emotionally Abused: Not on file    Physically Abused: Not on file    Sexually Abused: Not on file   Housing Stability:     Unable to Pay for Housing in the Last Year: Not on file    Number of [place] in the Last Year: Not on file    Unstable Housing in the Last Year: Not on file       ROS:   GEN: Denies recent weight loss, fatigue, fevers, chills. HEENT: No rhinorrhea, dysphagia, odynphagia. CV: No history of MI, recent chest pain. RESP: Denies shortness of breath, COPD, asthma. GI: + rectal bleeding  : Denies increased frequency or dysuria. HEM[de-identified] + anemia  ENDO: + diabetes  NEURO: Denies history of CVA, TIA.     Physical Exam:  Vitals:    07/06/22 0815   BP: 122/86   Pulse: 78   Temp: 96.8 °F (36 °C)   SpO2: 97%     General:A & O x3  HEENT:  NCAT, PERRL, EMOI, oral mucus membrane pink and moist, no mass palpated on neck exam  Heart: RRR  Lungs: Unlabored  Abdomen: Soft nontender, nondistended  RECTAL: external hemorrhoid, nonthrombosed, circumferential rectal fullness, no gross blood  Extremity: Normal, without deformities, edema, or skin discoloration  SKIN: Skin color, texture, turgor normal. No rashes or lesions. Neuro: CN II-XII grossly intact. No motor or sensory deficits appreciated. MK:normal throughout upper and lower extremities    Assessment      Diagnosis Orders   1. Rectal bleeding         Plan   1. Lengthy discussion held discussing the various etiologies of rectal bleeding including but not limited to diverticulosis, benign polyps, colon cancer, internal and external hemorrhoids  2. Based on patient's age and symptomatology, recommend colonoscopy with possible biopsy and internal hemorrhoid banding as indicated  3. Informed consent was obtained  4. We will follow up with patient after her procedure    Discussed with Dr Khalif Estrada. Sofya Dumont MD  7/6/2022     Attending Physician Statement  I have discussed the case with Dr Dion Ramirez, including pertinent history and exam findings with the resident. I have seen and examined the patient and the key elements of the encounter have been performed by me. I agree with the assessment, plan and orders as documented by the resident.       Electronically signed by Brady Samuels IV, DO  on 7/13/2022 at 8:52 AM

## 2022-07-08 ENCOUNTER — HOSPITAL ENCOUNTER (OUTPATIENT)
Dept: MAMMOGRAPHY | Age: 57
Discharge: HOME | End: 2022-07-10
Payer: COMMERCIAL

## 2022-07-08 DIAGNOSIS — Z12.31 ENCOUNTER FOR SCREENING MAMMOGRAM FOR MALIGNANT NEOPLASM OF BREAST: ICD-10-CM

## 2022-07-08 PROCEDURE — 77063 BREAST TOMOSYNTHESIS BI: CPT

## 2022-07-28 ENCOUNTER — ANESTHESIA EVENT (OUTPATIENT)
Dept: OPERATING ROOM | Age: 57
End: 2022-07-28
Payer: COMMERCIAL

## 2022-07-28 ENCOUNTER — ANESTHESIA (OUTPATIENT)
Dept: OPERATING ROOM | Age: 57
End: 2022-07-28
Payer: COMMERCIAL

## 2022-07-28 ENCOUNTER — HOSPITAL ENCOUNTER (OUTPATIENT)
Age: 57
Setting detail: OUTPATIENT SURGERY
Discharge: HOME | End: 2022-07-28
Attending: SURGERY | Admitting: SURGERY
Payer: COMMERCIAL

## 2022-07-28 VITALS
RESPIRATION RATE: 17 BRPM | OXYGEN SATURATION: 92 % | HEART RATE: 69 BPM | TEMPERATURE: 97.8 F | WEIGHT: 125 LBS | SYSTOLIC BLOOD PRESSURE: 146 MMHG | HEIGHT: 63 IN | BODY MASS INDEX: 22.15 KG/M2 | DIASTOLIC BLOOD PRESSURE: 84 MMHG

## 2022-07-28 LAB — GLUCOSE BLD-MCNC: 99 MG/DL (ref 65–105)

## 2022-07-28 PROCEDURE — 2500000003 HC RX 250 WO HCPCS: Performed by: ANESTHESIOLOGY

## 2022-07-28 PROCEDURE — 2580000003 HC RX 258: Performed by: ANESTHESIOLOGY

## 2022-07-28 PROCEDURE — 7100000011 HC PHASE II RECOVERY - ADDTL 15 MIN: Performed by: SURGERY

## 2022-07-28 PROCEDURE — 3700000001 HC ADD 15 MINUTES (ANESTHESIA): Performed by: SURGERY

## 2022-07-28 PROCEDURE — 2500000003 HC RX 250 WO HCPCS

## 2022-07-28 PROCEDURE — 93005 ELECTROCARDIOGRAM TRACING: CPT | Performed by: ANESTHESIOLOGY

## 2022-07-28 PROCEDURE — 2709999900 HC NON-CHARGEABLE SUPPLY: Performed by: SURGERY

## 2022-07-28 PROCEDURE — 6360000002 HC RX W HCPCS

## 2022-07-28 PROCEDURE — 82947 ASSAY GLUCOSE BLOOD QUANT: CPT

## 2022-07-28 PROCEDURE — 3609027000 HC COLONOSCOPY: Performed by: SURGERY

## 2022-07-28 PROCEDURE — 7100000010 HC PHASE II RECOVERY - FIRST 15 MIN: Performed by: SURGERY

## 2022-07-28 PROCEDURE — 3700000000 HC ANESTHESIA ATTENDED CARE: Performed by: SURGERY

## 2022-07-28 RX ORDER — SODIUM CHLORIDE 0.9 % (FLUSH) 0.9 %
5-40 SYRINGE (ML) INJECTION EVERY 12 HOURS SCHEDULED
Status: DISCONTINUED | OUTPATIENT
Start: 2022-07-28 | End: 2022-07-28 | Stop reason: HOSPADM

## 2022-07-28 RX ORDER — SODIUM CHLORIDE 9 MG/ML
INJECTION, SOLUTION INTRAVENOUS PRN
Status: DISCONTINUED | OUTPATIENT
Start: 2022-07-28 | End: 2022-07-28 | Stop reason: HOSPADM

## 2022-07-28 RX ORDER — LIDOCAINE HYDROCHLORIDE 10 MG/ML
INJECTION, SOLUTION EPIDURAL; INFILTRATION; INTRACAUDAL; PERINEURAL PRN
Status: DISCONTINUED | OUTPATIENT
Start: 2022-07-28 | End: 2022-07-28 | Stop reason: SDUPTHER

## 2022-07-28 RX ORDER — SODIUM CHLORIDE 0.9 % (FLUSH) 0.9 %
5-40 SYRINGE (ML) INJECTION PRN
Status: DISCONTINUED | OUTPATIENT
Start: 2022-07-28 | End: 2022-07-28 | Stop reason: HOSPADM

## 2022-07-28 RX ORDER — ONDANSETRON 2 MG/ML
4 INJECTION INTRAMUSCULAR; INTRAVENOUS
Status: DISCONTINUED | OUTPATIENT
Start: 2022-07-28 | End: 2022-07-28 | Stop reason: HOSPADM

## 2022-07-28 RX ORDER — SODIUM CHLORIDE, SODIUM LACTATE, POTASSIUM CHLORIDE, CALCIUM CHLORIDE 600; 310; 30; 20 MG/100ML; MG/100ML; MG/100ML; MG/100ML
INJECTION, SOLUTION INTRAVENOUS CONTINUOUS
Status: DISCONTINUED | OUTPATIENT
Start: 2022-07-28 | End: 2022-07-28 | Stop reason: HOSPADM

## 2022-07-28 RX ORDER — LABETALOL HYDROCHLORIDE 5 MG/ML
5 INJECTION, SOLUTION INTRAVENOUS EVERY 10 MIN PRN
Status: DISCONTINUED | OUTPATIENT
Start: 2022-07-28 | End: 2022-07-28 | Stop reason: HOSPADM

## 2022-07-28 RX ORDER — PROPOFOL 10 MG/ML
INJECTION, EMULSION INTRAVENOUS PRN
Status: DISCONTINUED | OUTPATIENT
Start: 2022-07-28 | End: 2022-07-28 | Stop reason: SDUPTHER

## 2022-07-28 RX ADMIN — PROPOFOL 400 MG: 10 INJECTION, EMULSION INTRAVENOUS at 14:11

## 2022-07-28 RX ADMIN — SODIUM CHLORIDE, POTASSIUM CHLORIDE, SODIUM LACTATE AND CALCIUM CHLORIDE: 600; 310; 30; 20 INJECTION, SOLUTION INTRAVENOUS at 13:32

## 2022-07-28 RX ADMIN — LIDOCAINE HYDROCHLORIDE 50 MG: 10 INJECTION, SOLUTION EPIDURAL; INFILTRATION; INTRACAUDAL; PERINEURAL at 14:11

## 2022-07-28 RX ADMIN — Medication 5 MG: at 15:27

## 2022-07-28 ASSESSMENT — PAIN SCALES - GENERAL
PAINLEVEL_OUTOF10: 0

## 2022-07-28 ASSESSMENT — PAIN - FUNCTIONAL ASSESSMENT: PAIN_FUNCTIONAL_ASSESSMENT: 0-10

## 2022-07-28 ASSESSMENT — PAIN DESCRIPTION - DESCRIPTORS: DESCRIPTORS: ACHING;CRAMPING

## 2022-07-28 NOTE — H&P
H&P  General Surgery        Pt Name: Tejal Jaimes  MRN: [id number]  YOB: 1965  Date of evaluation: 2022      [x] I have examined the patient and reviewed the H&P/Consult completed 2022, and there are no changes to the patient or plans. [] I have examined the patient and reviewed the H&P/Consult and have noted the following changes:     I have included the previous office H&P below:    Elton Moser MD  Surgery Department  Pager Number: Perfect serve    [facility]    Patient's Name/Date of Birth: Tejal Jaimes / 1965 (49 y.o.)    Date: 2022     HPI: Pt is a 61 y.o. female with history of diabetes who presents for evaluation of rectal bleeding. She states that on and off for the last year she has had intermittent, painless rectal bleeding and fullness. She states that for the last 2-3 weeks she has had some burning pain and itching as well. She has been using anusol cream with improvement. Denies any previous colonoscopy in the past. No weight loss Reports family hx of cancer but unsure what type. She has hx of  section.       PMH:  Diabetes  Depression    PSH:      Current Outpatient Medications   Medication Sig Dispense Refill    metFORMIN (GLUCOPHAGE) 500 MG tablet Take 1 tablet by mouth 2 times daily (with meals) 180 tablet 1    hydrocortisone (ANUSOL-HC) 2.5 % CREA rectal cream Apply to rectum twice daily 1 each 0    docusate sodium (COLACE) 100 MG capsule Take 1 capsule by mouth 2 times daily 60 capsule 0    dicyclomine (BENTYL) 10 MG capsule Take 1 capsule by mouth every 6 hours as needed (cramps) 20 capsule 0    famotidine (PEPCID) 10 MG tablet Take 1 tablet by mouth 2 times daily 60 tablet 3    sertraline (ZOLOFT) 25 MG tablet Take 1 tablet by mouth daily 30 tablet 3    melatonin (RA MELATONIN) 3 MG TABS tablet Take 1 tablet by mouth nightly as needed (insomnia) 15 tablet 0     No current facility-administered medications for this visit. No Known Allergies    No family history on file. Social History     Socioeconomic History    Marital status: Single     Spouse name: Not on file    Number of children: Not on file    Years of education: Not on file    Highest education level: Not on file   Occupational History    Not on file   Tobacco Use    Smoking status: Smoker, Current Status Unknown     Packs/day: 0.50    Smokeless tobacco: Never Used   Substance and Sexual Activity    Alcohol use: Yes     Alcohol/week: 3.0 standard drinks     Types: 3 Cans of beer per week    Drug use: No    Sexual activity: Never   Other Topics Concern    Not on file   Social History Narrative    Not on file     Social Determinants of Health     Financial Resource Strain: Low Risk     Difficulty of Paying Living Expenses: Not hard at all   Food Insecurity: No Food Insecurity    Worried About [address] in the Last Year: Never true    [address]  [employer] in the Last Year: Never true   Transportation Needs:     Lack of Transportation (Medical): Not on file    Lack of Transportation (Non-Medical):  Not on file   Physical Activity:     Days of Exercise per Week: Not on file    Minutes of Exercise per Session: Not on file   Stress:     Feeling of Stress : Not on file   Social Connections:     Frequency of Communication with Friends and Family: Not on file    Frequency of Social Gatherings with Friends and Family: Not on file    Attends Restorationist Services: Not on file    Active Member of Clubs or Organizations: Not on file    Attends Club or Organization Meetings: Not on file    Marital Status: Not on file   Intimate Partner Violence:     Fear of Current or Ex-Partner: Not on file    Emotionally Abused: Not on file    Physically Abused: Not on file    Sexually Abused: Not on file   Housing Stability:     Unable to Pay for Housing in the Last Year: Not on file    Number of [place] in the Last Year: Not on file    Unstable Housing in the Last Year: Not on file       ROS:   GEN: Denies recent weight loss, fatigue, fevers, chills. HEENT: No rhinorrhea, dysphagia, odynphagia. CV: No history of MI, recent chest pain. RESP: Denies shortness of breath, COPD, asthma. GI: + rectal bleeding  : Denies increased frequency or dysuria. HEM[de-identified] + anemia  ENDO: + diabetes  NEURO: Denies history of CVA, TIA. Physical Exam:  Vitals:    07/06/22 0815   BP: 122/86   Pulse: 78   Temp: 96.8 °F (36 °C)   SpO2: 97%     General:A & O x3  HEENT:  NCAT, PERRL, EMOI, oral mucus membrane pink and moist, no mass palpated on neck exam  Heart: RRR  Lungs: Unlabored  Abdomen: Soft nontender, nondistended  RECTAL: external hemorrhoid, nonthrombosed, circumferential rectal fullness, no gross blood  Extremity: Normal, without deformities, edema, or skin discoloration  SKIN: Skin color, texture, turgor normal. No rashes or lesions. Neuro: CN II-XII grossly intact. No motor or sensory deficits appreciated. MK:normal throughout upper and lower extremities    Assessment      Diagnosis Orders   1. Rectal bleeding         Plan   Lengthy discussion held discussing the various etiologies of rectal bleeding including but not limited to diverticulosis, benign polyps, colon cancer, internal and external hemorrhoids  Based on patient's age and symptomatology, recommend colonoscopy with possible biopsy and internal hemorrhoid banding as indicated  Informed consent was obtained  We will follow up with patient after her procedure    Discussed with Dr Dennis Galloway. Dashawn Clark MD  7/6/2022     Attending Physician Statement  I have discussed the case with Dr Zabrina Young, including pertinent history and exam findings with the resident. I have seen and examined the patient and the key elements of the encounter have been performed by me. I agree with the assessment, plan and orders as documented by the resident.       Electronically signed by Henry Samuels IV, DO  on 7/13/2022 at 8:52 AM

## 2022-07-28 NOTE — H&P
H&P  General Surgery        Pt Name: Leana Berman  MRN: [id number]  YOB: 1965  Date of evaluation: 2022      [x] I have examined the patient and reviewed the H&P/Consult completed 2022, and there are no changes to the patient or plans. [] I have examined the patient and reviewed the H&P/Consult and have noted the following changes:     I have included the previous office H&P below:      Dale Elliott MD  Surgery Department  Pager Number: Perfect serve      History and Physical  [facility]    Patient's Name/Date of Birth: Leana Berman / 1965 (76 y.o.)    Date: 2022     HPI: Pt is a 61 y.o. female with history of diabetes who presents for evaluation of rectal bleeding. She states that on and off for the last year she has had intermittent, painless rectal bleeding and fullness. She states that for the last 2-3 weeks she has had some burning pain and itching as well. She has been using anusol cream with improvement. Denies any previous colonoscopy in the past. No weight loss Reports family hx of cancer but unsure what type. She has hx of  section.       PMH:  Diabetes  Depression    PSH:      Current Outpatient Medications   Medication Sig Dispense Refill    metFORMIN (GLUCOPHAGE) 500 MG tablet Take 1 tablet by mouth 2 times daily (with meals) 180 tablet 1    hydrocortisone (ANUSOL-HC) 2.5 % CREA rectal cream Apply to rectum twice daily 1 each 0    docusate sodium (COLACE) 100 MG capsule Take 1 capsule by mouth 2 times daily 60 capsule 0    dicyclomine (BENTYL) 10 MG capsule Take 1 capsule by mouth every 6 hours as needed (cramps) 20 capsule 0    famotidine (PEPCID) 10 MG tablet Take 1 tablet by mouth 2 times daily 60 tablet 3    sertraline (ZOLOFT) 25 MG tablet Take 1 tablet by mouth daily 30 tablet 3    melatonin (RA MELATONIN) 3 MG TABS tablet Take 1 tablet by mouth nightly as needed (insomnia) 15 tablet 0     No current facility-administered medications for this visit. No Known Allergies    No family history on file. Social History     Socioeconomic History    Marital status: Single     Spouse name: Not on file    Number of children: Not on file    Years of education: Not on file    Highest education level: Not on file   Occupational History    Not on file   Tobacco Use    Smoking status: Smoker, Current Status Unknown     Packs/day: 0.50    Smokeless tobacco: Never Used   Substance and Sexual Activity    Alcohol use: Yes     Alcohol/week: 3.0 standard drinks     Types: 3 Cans of beer per week    Drug use: No    Sexual activity: Never   Other Topics Concern    Not on file   Social History Narrative    Not on file     Social Determinants of Health     Financial Resource Strain: Low Risk     Difficulty of Paying Living Expenses: Not hard at all   Food Insecurity: No Food Insecurity    Worried About [employer] in the Last Year: Never true    [employer] in the Last Year: Never true   Transportation Needs:     Lack of Transportation (Medical): Not on file    Lack of Transportation (Non-Medical):  Not on file   Physical Activity:     Days of Exercise per Week: Not on file    Minutes of Exercise per Session: Not on file   Stress:     Feeling of Stress : Not on file   Social Connections:     Frequency of Communication with Friends and Family: Not on file    Frequency of Social Gatherings with Friends and Family: Not on file    Attends [employer] Services: Not on file    Active Member of Clubs or Organizations: Not on file    Attends Club or Organization Meetings: Not on file    Marital Status: Not on file   Intimate Partner Violence:     Fear of Current or Ex-Partner: Not on file    Emotionally Abused: Not on file    Physically Abused: Not on file    Sexually Abused: Not on file   Housing Stability:     Unable to Pay for Housing in the Last Year: Not on file    Number of [place] in the Last Year: Not on file    Unstable Housing in the Last Year: Not on file       ROS:   GEN: Denies recent weight loss, fatigue, fevers, chills. HEENT: No rhinorrhea, dysphagia, odynphagia. CV: No history of MI, recent chest pain. RESP: Denies shortness of breath, COPD, asthma. GI: + rectal bleeding  : Denies increased frequency or dysuria. HEM[de-identified] + anemia  ENDO: + diabetes  NEURO: Denies history of CVA, TIA. Physical Exam:  Vitals:    07/06/22 0815   BP: 122/86   Pulse: 78   Temp: 96.8 °F (36 °C)   SpO2: 97%     General:A & O x3  HEENT:  NCAT, PERRL, EMOI, oral mucus membrane pink and moist, no mass palpated on neck exam  Heart: RRR  Lungs: Unlabored  Abdomen: Soft nontender, nondistended  RECTAL: external hemorrhoid, nonthrombosed, circumferential rectal fullness, no gross blood  Extremity: Normal, without deformities, edema, or skin discoloration  SKIN: Skin color, texture, turgor normal. No rashes or lesions. Neuro: CN II-XII grossly intact. No motor or sensory deficits appreciated. MK:normal throughout upper and lower extremities    Assessment      Diagnosis Orders   1. Rectal bleeding         Plan   Lengthy discussion held discussing the various etiologies of rectal bleeding including but not limited to diverticulosis, benign polyps, colon cancer, internal and external hemorrhoids  Based on patient's age and symptomatology, recommend colonoscopy with possible biopsy and internal hemorrhoid banding as indicated  Informed consent was obtained  We will follow up with patient after her procedure    Discussed with Dr Shelbi Godfrey. Kateryna Boss MD  7/6/2022     Attending Physician Statement  I have discussed the case with Dr Jeannie Shea, including pertinent history and exam findings with the resident. I have seen and examined the patient and the key elements of the encounter have been performed by me. I agree with the assessment, plan and orders as documented by the resident.       Electronically signed by Celi Samuels IV, DO  on 7/13/2022 at 8:52 AM

## 2022-07-28 NOTE — DISCHARGE INSTRUCTIONS
[facility]     POST-ENDOSCOPY INSTRUCTIONS    1. ACTIVITY  ___x_ No driving , operating machinery, or making important decisions for 24 hours. Resume normal activity after 24 hours. You may return to work after 24 hours. ____ Resume normal activity    2. DIET       _x___ (Colonscopy/Flex Sig):  Resume your usual diet unless specified below. ____ Diet Modification:    3. MEDICATIONS (Do not consume alcohol, tranquilizers, or sleeping medications for           24 hours unless advised by your physician)       __x___ Resume your usual medications (except if taking blood thinners, hold for:   ____ days   ____week      4. PHYSICIAN FOLLOW-UP        ____ Please call the office for an appointment/further instructions. ______3 yrs   ___x___5 yrs   ______10 yrs            _____ follow up in office in week    ______ days   ______weeks              ______months             ______ years             __x__ See your family physician. 5. ADDITIONAL INSTRUCTIONS          6. NORMAL CHANGES YOU MAY EXPERIENCE AFTER ENDOSCOPY:         COLONOSCOPY  Passing of gas for several hours after procedure  Some mild abdominal cramping  If a biopsy/polypectomy was done, you may see some spotting of blood  You may feel fatigued for the next 24-48 hours due to the prep and sedation    7. CALL YOUR PHYSICIAN IF YOU EXPERIENCE ANY OF THE FOLLOWING      A. Passing blood rectally or vomiting blood (color may be red or black)      B. Severe abdominal pain or tenderness (that is not relieved by passing air)      C.   Fever, chills, or excessive sweating      D.  Persistent nausea or vomiting      E.  Redness or swelling at the IV site    If you have additional questions, PLEASE call your doctor [phone]      Bernie Duffy MD  General Surgery

## 2022-07-28 NOTE — ANESTHESIA POSTPROCEDURE EVALUATION
Department of Anesthesiology  Postprocedure Note    Patient: Emery Mcmahon  MRN: [id number]  YOB: 1965  Date of evaluation: 7/28/2022      Procedure Summary     Date: 07/28/22 Room / Location: [address]    Anesthesia Start: 1406 Anesthesia Stop: 2484    Procedure: COLONOSCOPY DIAGNOSTIC Diagnosis:       Rectal bleeding      (RECTAL BLEEDING)    Surgeons: Ania Moraes IV, DO Responsible Provider: Sabas Olea MD    Anesthesia Type: MAC ASA Status: 2          Anesthesia Type: No value filed.     Khadijah Phase I: Khadijah Score: 9    Khadijah Phase II:        Anesthesia Post Evaluation    Patient location during evaluation: PACU  Patient participation: complete - patient participated  Level of consciousness: awake and alert  Pain score: 0  Airway patency: patent  Nausea & Vomiting: no vomiting and no nausea  Complications: no  Cardiovascular status: hemodynamically stable  Respiratory status: acceptable  Hydration status: stable

## 2022-07-28 NOTE — ANESTHESIA PRE PROCEDURE
Department of Anesthesiology  Preprocedure Note       Name:  Bess Valera   Age:  61 y.o.  :  1965                                          MRN:  [id number]         Date:  2022      Surgeon: Laurita Hernandez):  Suellen Samuels IV,     Procedure: Procedure(s):  COLONOSCOPY DIAGNOSTIC, POSSIBLE BIOPSY, POSSIBLE POLYPECTOMY -GI SCHEDULED  POSSIBLE INTERNAL HEMORRHOID BANDING    Medications prior to admission:   Prior to Admission medications    Medication Sig Start Date End Date Taking? Authorizing Provider   Polyethylene Glycol [address] PLEASE USE AS INSTRUCTED ON YOUR COLONOSCOPY PREP SHEET. 22   Verline Nageotte, MD   metFORMIN (GLUCOPHAGE) 500 MG tablet Take 1 tablet by mouth 2 times daily (with meals) 22   Nia Rizo MD   hydrocortisone (ANUSOL-HC) 2.5 % CREA rectal cream Apply to rectum twice daily 22   Brain Piña MD   dicyclomine (BENTYL) 10 MG capsule Take 1 capsule by mouth every 6 hours as needed (cramps)  Patient not taking: Reported on 2022   Brain Piña MD   famotidine (PEPCID) 10 MG tablet Take 1 tablet by mouth 2 times daily  Patient not taking: Reported on 2022   Bartolome Ellis MD   sertraline (ZOLOFT) 25 MG tablet Take 1 tablet by mouth daily  Patient not taking: Reported on 2022   Bartolome Ellis MD       Current medications:    Current Facility-Administered Medications   Medication Dose Route Frequency Provider Last Rate Last Admin    lactated ringers infusion   IntraVENous Continuous Onetha MD Umm 100 mL/hr at 22 1332 New Bag at 22 1332       Allergies:  No Known Allergies    Problem List:  There is no problem list on file for this patient.       Past Medical History:        Diagnosis Date    Diabetes mellitus ([address]     Snores     Wellness examination     [place]  last seen 2022       Past Surgical History:        Procedure Laterality Date     SECTION N/A        Social History:    Social History     Tobacco Use    Smoking status: Every Day     Packs/day: 0.50     Types: Cigarettes    Smokeless tobacco: Never   Substance Use Topics    Alcohol use: Yes     Alcohol/week: 6.0 standard drinks     Types: 6 Cans of beer per week     Comment: 6 beers/week                                Ready to quit: Not Answered  Counseling given: Not Answered      Vital Signs (Current):   Vitals:    07/26/22 1001 07/28/22 1300   BP:  (!) 146/87   Pulse:  76   Resp:  16   Temp:  97.3 °F (36.3 °C)   TempSrc:  Temporal   SpO2:  100%   Weight: 125 lb (56.7 kg) 125 lb (56.7 kg)   Height: 5' 3\" (1.6 m) 5' 3\" (1.6 m)                                              BP Readings from Last 3 Encounters:   07/28/22 (!) 146/87   07/06/22 122/86   06/24/22 137/88       NPO Status: Time of last liquid consumption: 2100                        Time of last solid consumption: 1900                        Date of last liquid consumption: 07/27/22                        Date of last solid food consumption: 07/26/22    BMI:   Wt Readings from Last 3 Encounters:   07/28/22 125 lb (56.7 kg)   07/06/22 124 lb 6.4 oz (56.4 kg)   06/24/22 120 lb 6.4 oz (54.6 kg)     Body mass index is 22.14 kg/m².     CBC:   Lab Results   Component Value Date/Time    WBC 4.7 06/21/2022 08:34 AM    RBC 3.30 06/21/2022 08:34 AM    HGB 11.3 06/21/2022 08:34 AM    HCT 33.5 06/21/2022 08:34 AM    .5 06/21/2022 08:34 AM    RDW 13.9 06/21/2022 08:34 AM     06/21/2022 08:34 AM       CMP:   Lab Results   Component Value Date/Time     06/21/2022 09:13 AM    K 4.1 06/21/2022 09:13 AM     06/21/2022 09:13 AM    CO2 17 06/21/2022 09:13 AM    BUN 8 06/21/2022 09:13 AM    CREATININE 0.54 06/21/2022 09:13 AM    GFRAA >60 06/21/2022 09:13 AM    LABGLOM >60 06/21/2022 09:13 AM    GLUCOSE 262 06/21/2022 09:13 AM    PROT 5.9 06/21/2022 09:13 AM    CALCIUM 8.3 06/21/2022 09:13 AM    BILITOT 1.06 06/21/2022 09:13 AM    ALKPHOS 81 06/21/2022 09:13 AM     06/21/2022 09:13 AM    ALT 59 06/21/2022 09:13 AM       POC Tests: No results for input(s): POCGLU, POCNA, POCK, POCCL, POCBUN, POCHEMO, POCHCT in the last 72 hours. Coags: No results found for: PROTIME, INR, APTT    HCG (If Applicable): No results found for: PREGTESTUR, PREGSERUM, HCG, HCGQUANT     ABGs: No results found for: PHART, PO2ART, LNK4FRC, ESC0SVS, BEART, A3GVNJCW     Type & Screen (If Applicable):  No results found for: LABABO, LABRH    Drug/Infectious Status (If Applicable):  No results found for: HIV, HEPCAB    COVID-19 Screening (If Applicable): No results found for: COVID19        Anesthesia Evaluation  Patient summary reviewed no history of anesthetic complications:   Airway: Mallampati: II  TM distance: >3 FB   Neck ROM: full  Mouth opening: > = 3 FB   Dental:          Pulmonary:Negative Pulmonary ROS and normal exam                               Cardiovascular:Negative CV ROS            Rhythm: regular  Rate: normal                    Neuro/Psych:   Negative Neuro/Psych ROS              GI/Hepatic/Renal: Neg GI/Hepatic/Renal ROS            Endo/Other:    (+) DiabetesType II DM, no interval change, , .                 Abdominal:             Vascular: negative vascular ROS. Other Findings:           Anesthesia Plan      MAC     ASA 2       Induction: intravenous. Anesthetic plan and risks discussed with patient. Plan discussed with CRNA.                     Edil Mcwilliams MD   7/28/2022

## 2022-07-28 NOTE — BRIEF OP NOTE
Brief Postoperative Note      Patient: Noreen Cormier  YOB: 1965  MRN: [id number]    Date of Procedure: 7/28/2022    Pre-Op Diagnosis: RECTAL BLEEDING    Post-Op Diagnosis:  Internal hemorrhoides       Procedure(s):  COLONOSCOPY DIAGNOSTIC    Surgeon(s):  DO Domonique Hobson IV, MD    Assistant:  * No surgical staff found *    Anesthesia: Monitor Anesthesia Care    Estimated Blood Loss (mL): Minimal    Complications: None    Specimens:   * No specimens in log *    Implants:  * No implants in log *      Drains: * No LDAs found *    Findings: one hyperplastic polyp identified in colon. Grade 1 internal hemorrhoids present. No fissure seen.     Electronically signed by Domonique Akbar MD on 7/28/2022 at 2:43 PM

## 2022-07-28 NOTE — PROGRESS NOTES
Called Faviola Eisenberg answered, explained pts BP is going up to 147/100 and continuously raising. He verbally ordered labetelol 5mg-20mg as needed. Gave pt 5mg of labetelol and BP went down to 146/84. Called Keny answered, explained the situation and asked if it was ok to discharge pt, he did give me the ok to send her home. I suggested to the pt that she should go to a family doctor to get her BP checked. Gave pt and sister discharge instructions, verbalized understanding.

## 2022-07-29 LAB
EKG ATRIAL RATE: 70 BPM
EKG P AXIS: 63 DEGREES
EKG P-R INTERVAL: 118 MS
EKG Q-T INTERVAL: 424 MS
EKG QRS DURATION: 92 MS
EKG QTC CALCULATION (BAZETT): 457 MS
EKG R AXIS: 42 DEGREES
EKG T AXIS: 33 DEGREES
EKG VENTRICULAR RATE: 70 BPM

## 2022-07-29 PROCEDURE — 93010 ELECTROCARDIOGRAM REPORT: CPT | Performed by: INTERNAL MEDICINE

## 2022-07-29 NOTE — OP NOTE
Operative Note      Patient: Susan Florence  YOB: 1965  MRN: [id number]    Date of Procedure: 7/28/2022    Pre-Op Diagnosis: RECTAL BLEEDING    Post-Op Diagnosis:  Internal hemorrhoids       Procedure(s):  COLONOSCOPY DIAGNOSTIC    Surgeon(s):  DO Lanette Zavala IV, MD    Assistant:   * No surgical staff found *    Anesthesia: Monitor Anesthesia Care    Estimated Blood Loss (mL): Minimal    Complications: None    Specimens:   * No specimens in log *    Implants:  * No implants in log *      Drains: * No LDAs found *    Findings: one hyperplastic polyp identified in colon. Grade 1 internal hemorrhoids present. No fissure seen. Detailed Description of Procedure:   Patient was brought to the endoscopy suite and placed in the left lateral decubitus position. At this point in time, MAC anesthesia was administered, and a preprocedure time-out was performed. Rectal exam was performed, revealing no fissures, fistulas, or palpable hemorrhoids. A Colonoscope was inserted into the anus and passed without undue difficulty to the cecum. The appendiceal orifice was visualized, as well as the ileocecal valve and convergence of the tinea coli. The terminal ileum was intubated, revealing normal appearing mucosa. During a slow withdrawal, the entirety of the colon was circumferentially visualized, revealing apparently normal mucosa. Bowel prep was adequate. Biopsy was not done of any hyperplastic polyp. The endoscope was then retroflexed in the rectum, revealing normal rectal and anal tissue with internal hemorrhoids. The endoscope was straightened and then removed from the anus, and the patient returned in supine position. The patient tolerated the procedure well and was brought to the recovery area in the endoscopy suite. Dr Maikel Zhu was present for the entirety of the case.     Electronically signed by Lanette Ramirez MD on 7/28/2022 at 10:04 PM

## 2022-09-02 ENCOUNTER — NURSE TRIAGE (OUTPATIENT)
Dept: OTHER | Facility: CLINIC | Age: 57
End: 2022-09-02

## 2022-09-02 NOTE — TELEPHONE ENCOUNTER
Received call from Francisco Dumas at [facility] with [facility]. Current Symptoms: Bilateral leg pain and swelling that stops at knee    Onset: 3 weeks ago;       Pain Severity: 7/10; Temperature: denies     What has been tried: tylenol    Denies - chest pain / shortness of breath / red streaking on legs / swelling to face arms or hands    Recommended disposition: See PCP within 3 Days    Care advice provided, patient verbalizes understanding; denies any other questions or concerns; instructed to call back for any new or worsening symptoms. Patient/Caller agrees with recommended disposition; writer provided warm transfer to Belkys Butcher at [facility] for appointment scheduling     Attention Provider: Thank you for allowing me to participate in the care of your patient. The patient was connected to triage in response to information provided to the E[facility]/PSC. Please do not respond through this encounter as the response is not directed to a shared pool.         Reason for Disposition   MILD swelling of both ankles (i.e., pedal edema) AND new-onset or worsening    Protocols used: Leg Swelling and Edema-ADULT-[place]

## 2022-09-06 ENCOUNTER — OFFICE VISIT (OUTPATIENT)
Dept: FAMILY MEDICINE CLINIC | Age: 57
End: 2022-09-06
Payer: COMMERCIAL

## 2022-09-06 VITALS
SYSTOLIC BLOOD PRESSURE: 158 MMHG | WEIGHT: 117 LBS | HEART RATE: 79 BPM | DIASTOLIC BLOOD PRESSURE: 100 MMHG | BODY MASS INDEX: 20.73 KG/M2

## 2022-09-06 DIAGNOSIS — E11.69 TYPE 2 DIABETES MELLITUS WITH OTHER SPECIFIED COMPLICATION, WITHOUT LONG-TERM CURRENT USE OF INSULIN (HCC): Primary | ICD-10-CM

## 2022-09-06 DIAGNOSIS — I10 PRIMARY HYPERTENSION: ICD-10-CM

## 2022-09-06 DIAGNOSIS — M21.612 BUNION OF LEFT FOOT: ICD-10-CM

## 2022-09-06 DIAGNOSIS — K21.9 GASTROESOPHAGEAL REFLUX DISEASE, UNSPECIFIED WHETHER ESOPHAGITIS PRESENT: ICD-10-CM

## 2022-09-06 DIAGNOSIS — K30 NUD (NONULCER DYSPEPSIA): ICD-10-CM

## 2022-09-06 DIAGNOSIS — K64.8 HEMORRHOIDS, INTERNAL: ICD-10-CM

## 2022-09-06 DIAGNOSIS — K58.2 IRRITABLE BOWEL SYNDROME WITH BOTH CONSTIPATION AND DIARRHEA: ICD-10-CM

## 2022-09-06 PROBLEM — E11.9 DIABETES MELLITUS (HCC): Status: ACTIVE | Noted: 2022-09-06

## 2022-09-06 PROCEDURE — G8427 DOCREV CUR MEDS BY ELIG CLIN: HCPCS

## 2022-09-06 PROCEDURE — 2022F DILAT RTA XM EVC RTNOPTHY: CPT

## 2022-09-06 PROCEDURE — G8420 CALC BMI NORM PARAMETERS: HCPCS

## 2022-09-06 PROCEDURE — 3052F HG A1C>EQUAL 8.0%<EQUAL 9.0%: CPT

## 2022-09-06 PROCEDURE — 99214 OFFICE O/P EST MOD 30 MIN: CPT

## 2022-09-06 PROCEDURE — 4004F PT TOBACCO SCREEN RCVD TLK: CPT

## 2022-09-06 PROCEDURE — 3017F COLORECTAL CA SCREEN DOC REV: CPT

## 2022-09-06 RX ORDER — HYDROCORTISONE 25 MG/G
CREAM TOPICAL
Qty: 1 EACH | Refills: 0 | Status: SHIPPED | OUTPATIENT
Start: 2022-09-06 | End: 2022-09-08 | Stop reason: SDUPTHER

## 2022-09-06 RX ORDER — FAMOTIDINE 10 MG
10 TABLET ORAL 2 TIMES DAILY
Qty: 60 TABLET | Refills: 3 | Status: SHIPPED | OUTPATIENT
Start: 2022-09-06 | End: 2022-09-08 | Stop reason: SDUPTHER

## 2022-09-06 RX ORDER — DICYCLOMINE HYDROCHLORIDE 10 MG/1
10 CAPSULE ORAL EVERY 6 HOURS PRN
Qty: 20 CAPSULE | Refills: 0 | Status: SHIPPED | OUTPATIENT
Start: 2022-09-06 | End: 2022-09-08 | Stop reason: SDUPTHER

## 2022-09-06 RX ORDER — LISINOPRIL AND HYDROCHLOROTHIAZIDE 12.5; 1 MG/1; MG/1
1 TABLET ORAL DAILY
Qty: 90 TABLET | Refills: 2 | Status: SHIPPED | OUTPATIENT
Start: 2022-09-06 | End: 2022-09-08 | Stop reason: SDUPTHER

## 2022-09-06 ASSESSMENT — ENCOUNTER SYMPTOMS
COLOR CHANGE: 0
CHEST TIGHTNESS: 0
CONSTIPATION: 0
SHORTNESS OF BREATH: 0
DIARRHEA: 0
COUGH: 0
ABDOMINAL PAIN: 0
PHOTOPHOBIA: 0

## 2022-09-06 NOTE — PROGRESS NOTES
6 [facility] Medicine Residency Program - Outpatient Note      Subjective:    Amanda Ordonez is a 61 y.o. female with  has a past medical history of Diabetes mellitus ([address] Snores, and Wellness examination. 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 Eyes:  Negative for photophobia and visual disturbance. Respiratory:  Negative for cough, chest tightness and shortness of breath. Cardiovascular:  Positive for leg swelling. Negative for chest pain and palpitations. Gastrointestinal:  Negative for abdominal pain, constipation and diarrhea. Endocrine: Negative for polydipsia, polyphagia and polyuria. Skin:  Negative for color change and wound. Neurological:  Positive for numbness (and tingling). Negative for dizziness, weakness, light-headedness and headaches.        The patient has a   Family History   Problem Relation Age of Onset    Diabetes Mother        Objective:    BP (!) 158/100   Pulse 79   Wt 117 lb (53.1 kg)   BMI 20.73 kg/m²    BP Readings from Last 3 Encounters:   09/06/22 (!) 158/100   07/28/22 (!) 146/84   07/06/22 122/86       Physical Exam  Musculoskeletal:      Left foot: Bunion ([place]) present. Feet:    Feet:      Left foot:      Skin integrity: Callus ([place]) present. Visual inspection:  Deformity/amputation: absent  Skin lesions/pre-ulcerative calluses: absent  Edema: right- negative, left- negative    Sensory exam:  Monofilament sensation: normal  (minimum of 5 random plantar locations tested, avoiding callused areas - > 1 area with absence of sensation is + for neuropathy)    Plus at least one of the following:  Pulses: normal,   Pinprick: Intact  Proprioception: Intact  Vibration (128 Hz): N/A   Lab Results   Component Value Date    WBC 4.7 06/21/2022    HGB 11.3 (L) 06/21/2022    HCT 33.5 (L) 06/21/2022     06/21/2022    ALT 59 (H) 06/21/2022     (H) 06/21/2022     (L) 06/21/2022    K 4.1 06/21/2022     06/21/2022    CREATININE 0.54 06/21/2022    BUN 8 06/21/2022    CO2 17 (L) 06/21/2022    LABA1C 8.8 06/24/2022     Lab Results   Component Value Date    CALCIUM 8.3 (L) 06/21/2022     No results found for: LDLCALC, LDLCHOLESTEROL, LDLDIRECT    Assessment and Plan:    1. Type 2 diabetes mellitus with other specified complication, without long-term current use of insulin (HCC)  Last A1C done on 06/24/2022 was 8.8  -taking only metformin; compliant with medications  -Will keep only metformin for now  -b/l leg pain/swelling is most likely due to dependent edema and some neuropathic changes. Will monitor for now as well as the addition of the usage of compression stockings  -Foot exam was unremarkable  -will f/u in 4 weeks to check A1C  -counseled in length about healthier eating habits and the importance of exercise   -pt agrees and understands  -previous labs such as lipid, microalbumin and vit B12/folate were re-printed; once results come in, will discuss with the patient.  May need to initiate a statin depending on ASCVD risk % at next visit   -referral to ophthalmology for diabetic retinal eye exam    - Jovanny Conroy MD, Ophthalmology, [place]  - metFORMIN (GLUCOPHAGE) 500 MG tablet; Take 1 tablet by mouth 2 times daily (with meals)  Dispense: 180 tablet; Refill: 1  -  DIABETES FOOT EXAM    2. Primary HTN  -today BP was 186/97; recheck was 158/100  -given a BP log to check at home as well as a DME order to check BP at home and bring for next visit  -in the meantime, due to b/l leg swelling/pain, will start HTN medication at a low-dose  -will f/u in 4 weeks to reassess     -- DME Order for (Specify) as OP  - lisinopril-hydroCHLOROthiazide (PRINZIDE;ZESTORETIC) 10-12.5 MG per tablet; Take 1 tablet by mouth daily  Dispense: 90 tablet; Refill: 2  FACE TO FACE: Joie Monae was evaluated today and a DME order was entered for a BP KIT because she requires this to successfully complete daily monitoring of her blood pressure. A BP kit is necessary due to the patient's consistent high blood pressure readings in the clinic (and resulted in her starting a medication today). She is a higher risk patient due to her co-morbidity of her diabetes. The need for this equipment was discussed with the patient and she understands and is in agreement. 3. NUD (nonulcer dyspepsia)  Medication Refill  - famotidine (PEPCID) 10 MG tablet; Take 1 tablet by mouth 2 times daily  Dispense: 60 tablet; Refill: 3    4. Hemorrhoids, internal  Medication Refill  - hydrocortisone (ANUSOL-HC) 2.5 % CREA rectal cream; Apply to rectum twice daily  Dispense: 1 each; Refill: 0    5. Gastroesophageal reflux disease, unspecified whether esophagitis present  Medication Refill  - famotidine (PEPCID) 10 MG tablet; Take 1 tablet by mouth 2 times daily  Dispense: 60 tablet; Refill: 3    6. Irritable bowel syndrome with both constipation and diarrhea  Medication Refill  - dicyclomine (BENTYL) 10 MG capsule;  Take 1 capsule by mouth every 6 hours as needed (cramps)  Dispense: 20 capsule; Refill: 0    7. Bunion of left foot  -L bunion of L foot as well as a callus on L foot   -will refer to podiatry   - [facility]        Requested Prescriptions     Signed Prescriptions Disp Refills    dicyclomine (BENTYL) 10 MG capsule 20 capsule 0     Sig: Take 1 capsule by mouth every 6 hours as needed (cramps)    famotidine (PEPCID) 10 MG tablet 60 tablet 3     Sig: Take 1 tablet by mouth 2 times daily    hydrocortisone (ANUSOL-HC) 2.5 % CREA rectal cream 1 each 0     Sig: Apply to rectum twice daily    metFORMIN (GLUCOPHAGE) 500 MG tablet 180 tablet 1     Sig: Take 1 tablet by mouth 2 times daily (with meals)    lisinopril-hydroCHLOROthiazide (PRINZIDE;ZESTORETIC) 10-12.5 MG per tablet 90 tablet 2     Sig: Take 1 tablet by mouth daily       Medications Discontinued During This Encounter   Medication Reason    sertraline (ZOLOFT) 25 MG tablet LIST CLEANUP    Polyethylene Glycol 3350 POWD LIST CLEANUP    famotidine (PEPCID) 10 MG tablet REORDER    hydrocortisone (ANUSOL-HC) 2.5 % CREA rectal cream REORDER    dicyclomine (BENTYL) 10 MG capsule REORDER    metFORMIN (GLUCOPHAGE) 500 MG tablet REORDER       Mary Beth received counseling on the following healthy behaviors: nutrition, exercise and medication adherence    Discussed use,benefit, and side effects of prescribed medications. Barriers to medication compliance addressed. All patient questions answered. Pt voiced understanding. Return in about 4 weeks (around 10/4/2022) for HTN and labs f/u. Disclaimer: Some orall of this note was transcribed using voice-recognition software. This may cause typographical errors occasionally. Although all effort is made to fix these errors, please do not hesitate to contact our office if there Christal Rodriguez concern with the understanding of this note.

## 2022-09-08 DIAGNOSIS — K58.2 IRRITABLE BOWEL SYNDROME WITH BOTH CONSTIPATION AND DIARRHEA: ICD-10-CM

## 2022-09-08 DIAGNOSIS — K30 NUD (NONULCER DYSPEPSIA): ICD-10-CM

## 2022-09-08 DIAGNOSIS — K64.8 HEMORRHOIDS, INTERNAL: ICD-10-CM

## 2022-09-08 DIAGNOSIS — E11.69 TYPE 2 DIABETES MELLITUS WITH OTHER SPECIFIED COMPLICATION, WITHOUT LONG-TERM CURRENT USE OF INSULIN (HCC): ICD-10-CM

## 2022-09-08 DIAGNOSIS — I10 PRIMARY HYPERTENSION: Primary | ICD-10-CM

## 2022-09-08 DIAGNOSIS — K21.9 GASTROESOPHAGEAL REFLUX DISEASE, UNSPECIFIED WHETHER ESOPHAGITIS PRESENT: ICD-10-CM

## 2022-09-08 RX ORDER — LISINOPRIL AND HYDROCHLOROTHIAZIDE 12.5; 1 MG/1; MG/1
1 TABLET ORAL DAILY
Qty: 90 TABLET | Refills: 2 | Status: SHIPPED | OUTPATIENT
Start: 2022-09-08 | End: 2023-06-05

## 2022-09-08 RX ORDER — FAMOTIDINE 10 MG
10 TABLET ORAL 2 TIMES DAILY
Qty: 60 TABLET | Refills: 3 | Status: SHIPPED | OUTPATIENT
Start: 2022-09-08

## 2022-09-08 RX ORDER — DICYCLOMINE HYDROCHLORIDE 10 MG/1
10 CAPSULE ORAL EVERY 6 HOURS PRN
Qty: 30 CAPSULE | Refills: 2 | Status: SHIPPED | OUTPATIENT
Start: 2022-09-08 | End: 2022-10-08

## 2022-09-08 RX ORDER — HYDROCORTISONE 25 MG/G
CREAM TOPICAL
Qty: 1 EACH | Refills: 0 | Status: SHIPPED | OUTPATIENT
Start: 2022-09-08

## 2022-09-08 RX ORDER — DICYCLOMINE HYDROCHLORIDE 10 MG/1
10 CAPSULE ORAL EVERY 6 HOURS PRN
Qty: 20 CAPSULE | Refills: 0 | Status: CANCELLED | OUTPATIENT
Start: 2022-09-08

## 2022-09-08 NOTE — TELEPHONE ENCOUNTER
Last visit: 9/6/2022  Last Med refill: 9/6/2022  Does patient have enough medication for 72 hours: No: med refills were sent to wrong pharmacy    Next Visit Date:  Future Appointments   Date Time Provider Maximus Saenzi   10/21/2022  2:15 PM Nia Walls MD [address] [place] [address] Maintenance   Topic Date Due    Lipids  Never done    HIV screen  Never done    Diabetic microalbuminuria test  Never done    Diabetic retinal exam  Never done    Hepatitis C screen  Never done    Hepatitis B vaccine (1 of 3 - Risk 3-dose series) Never done    DTaP/Tdap/Td vaccine (1 - Tdap) Never done    Cervical cancer screen  Never done    Pneumococcal 0-64 years Vaccine (2 - PCV) 04/06/2013    Shingles vaccine (1 of 2) Never done    COVID-19 Vaccine (2 - Booster for Toshia series) 02/01/2022    Flu vaccine (1) Never done    A1C test (Diabetic or Prediabetic)  06/24/2023    Depression Screen  06/24/2023    Diabetic foot exam  09/06/2023    Breast cancer screen  07/08/2024    Colorectal Cancer Screen  07/28/2032    Hepatitis A vaccine  Aged Out    Hib vaccine  Aged Out    Meningococcal (ACWY) vaccine  Aged Out       Hemoglobin A1C (%)   Date Value   06/24/2022 8.8             ( goal A1C is < 7)   No results found for: LABMICR  No results found for: LDLCHOLESTEROL, LDLCALC    (goal LDL is <100)   AST (U/L)   Date Value   06/21/2022 179 (H)     ALT (U/L)   Date Value   06/21/2022 59 (H)     BUN (mg/dL)   Date Value   06/21/2022 8     BP Readings from Last 3 Encounters:   09/06/22 (!) 158/100   07/28/22 (!) 146/84   07/06/22 122/86          (goal 120/80)    All Future Testing planned in [facility]  Lab Frequency Next Occurrence   Lipid Panel Once 06/24/2022   Vitamin B12 & Folate Once 06/24/2022   Microalbumin, Ur Once 06/24/2022               Patient Active Problem List:     Diabetes mellitus ([facility] [address]     Primary hypertension     NUD (nonulcer dyspepsia)     Hemorrhoids, internal     Gastroesophageal reflux disease     Irritable

## 2022-09-21 NOTE — PROGRESS NOTES
Attending Physician Statement    Wt Readings from Last 3 Encounters:   09/06/22 117 lb (53.1 kg)   07/28/22 125 lb (56.7 kg)   07/06/22 124 lb 6.4 oz (56.4 kg)     Temp Readings from Last 3 Encounters:   07/28/22 97.8 °F (36.6 °C) (Temporal)   07/06/22 96.8 °F (36 °C)   06/21/22 98.4 °F (36.9 °C)     BP Readings from Last 3 Encounters:   09/06/22 (!) 158/100   07/28/22 (!) 146/84   07/06/22 122/86     Pulse Readings from Last 3 Encounters:   09/06/22 79   07/28/22 69   07/06/22 78       I have discussed the care of Yanet Andrade  including pertinent history and exam findings,  with the resident. I have seen and examined the patient and the key elements of all parts of the encounter have been performed by me. I agree with the assessment, plan and orders as documented by the resident.   (GC Modifier)

## 2023-04-12 ENCOUNTER — OFFICE VISIT (OUTPATIENT)
Dept: PODIATRY | Age: 58
End: 2023-04-12
Payer: COMMERCIAL

## 2023-04-12 VITALS
SYSTOLIC BLOOD PRESSURE: 148 MMHG | TEMPERATURE: 74 F | HEIGHT: 63 IN | DIASTOLIC BLOOD PRESSURE: 92 MMHG | WEIGHT: 118 LBS | BODY MASS INDEX: 20.91 KG/M2

## 2023-04-12 DIAGNOSIS — M21.612 BUNION OF LEFT FOOT: ICD-10-CM

## 2023-04-12 DIAGNOSIS — L84 CORNS AND CALLUS: Primary | ICD-10-CM

## 2023-04-12 DIAGNOSIS — M20.5X2 HALLUX LIMITUS OF LEFT FOOT: ICD-10-CM

## 2023-04-12 DIAGNOSIS — M79.672 LEFT FOOT PAIN: ICD-10-CM

## 2023-04-12 DIAGNOSIS — E11.69 TYPE 2 DIABETES MELLITUS WITH OTHER SPECIFIED COMPLICATION, WITHOUT LONG-TERM CURRENT USE OF INSULIN (HCC): ICD-10-CM

## 2023-04-12 DIAGNOSIS — M77.42 METATARSALGIA OF LEFT FOOT: ICD-10-CM

## 2023-04-12 PROCEDURE — 99203 OFFICE O/P NEW LOW 30 MIN: CPT

## 2023-04-12 PROCEDURE — G8420 CALC BMI NORM PARAMETERS: HCPCS

## 2023-04-12 PROCEDURE — 11055 PARING/CUTG B9 HYPRKER LES 1: CPT

## 2023-04-12 PROCEDURE — G8427 DOCREV CUR MEDS BY ELIG CLIN: HCPCS

## 2023-04-12 PROCEDURE — 3078F DIAST BP <80 MM HG: CPT

## 2023-04-12 PROCEDURE — 2022F DILAT RTA XM EVC RTNOPTHY: CPT

## 2023-04-12 PROCEDURE — 3074F SYST BP LT 130 MM HG: CPT

## 2023-04-12 PROCEDURE — 3017F COLORECTAL CA SCREEN DOC REV: CPT

## 2023-04-12 PROCEDURE — 3046F HEMOGLOBIN A1C LEVEL >9.0%: CPT

## 2023-04-12 PROCEDURE — 4004F PT TOBACCO SCREEN RCVD TLK: CPT

## 2023-07-27 DIAGNOSIS — I10 PRIMARY HYPERTENSION: ICD-10-CM

## 2023-07-27 DIAGNOSIS — E11.69 TYPE 2 DIABETES MELLITUS WITH OTHER SPECIFIED COMPLICATION, WITHOUT LONG-TERM CURRENT USE OF INSULIN (HCC): ICD-10-CM

## 2023-07-27 RX ORDER — LISINOPRIL AND HYDROCHLOROTHIAZIDE 12.5; 1 MG/1; MG/1
1 TABLET ORAL DAILY
Qty: 30 TABLET | Refills: 0 | Status: SHIPPED | OUTPATIENT
Start: 2023-07-27 | End: 2023-08-26

## 2023-07-27 NOTE — TELEPHONE ENCOUNTER
Last visit: 9/6/22  Last Med refill: 9/6/22  Does patient have enough medication for 72 hours: No:     Next Visit Date:  No future appointments.     Health Maintenance   Topic Date Due    Lipids  Never done    HIV screen  Never done    Diabetic Alb to Cr ratio (uACR) test  Never done    Diabetic retinal exam  Never done    Hepatitis C screen  Never done    Hepatitis B vaccine (1 of 3 - Risk 3-dose series) Never done    DTaP/Tdap/Td vaccine (1 - Tdap) Never done    Cervical cancer screen  Never done    Shingles vaccine (1 of 2) Never done    COVID-19 Vaccine (2 - Booster for Toshia series) 02/01/2022    GFR test (Diabetes, CKD 3-4, OR last GFR 15-59)  06/21/2023    A1C test (Diabetic or Prediabetic)  06/24/2023    Depression Screen  06/24/2023    Flu vaccine (1) 08/01/2023    Diabetic foot exam  09/06/2023    Breast cancer screen  07/08/2024    Colorectal Cancer Screen  07/28/2032    Pneumococcal 0-64 years Vaccine  Completed    Hepatitis A vaccine  Aged Out    Hib vaccine  Aged Out    Meningococcal (ACWY) vaccine  Aged Out       Hemoglobin A1C (%)   Date Value   06/24/2022 8.8             ( goal A1C is < 7)   No components found for: LABMICR  No results found for: LDLCHOLESTEROL, LDLCALC    (goal LDL is <100)   AST (U/L)   Date Value   06/21/2022 179 (H)     ALT (U/L)   Date Value   06/21/2022 59 (H)     BUN (mg/dL)   Date Value   06/21/2022 8     BP Readings from Last 3 Encounters:   04/12/23 (!) 148/92   09/06/22 (!) 158/100   07/28/22 (!) 146/84          (goal 120/80)    All Future Testing planned in [facility]  Lab Frequency Next Occurrence               Patient Active Problem List:     Diabetes mellitus ([address])     Primary hypertension     NUD (nonulcer dyspepsia)     Hemorrhoids, internal     Gastroesophageal reflux disease     Irritable bowel syndrome with both constipation and diarrhea     Bunion of left foot

## 2023-08-18 DIAGNOSIS — I10 PRIMARY HYPERTENSION: ICD-10-CM

## 2023-08-18 DIAGNOSIS — E11.69 TYPE 2 DIABETES MELLITUS WITH OTHER SPECIFIED COMPLICATION, WITHOUT LONG-TERM CURRENT USE OF INSULIN (HCC): ICD-10-CM

## 2023-08-18 NOTE — TELEPHONE ENCOUNTER
----- Message from Israel Haney sent at 8/18/2023 12:47 PM EDT -----  Subject: Refill Request    QUESTIONS  Name of Medication? metFORMIN (GLUCOPHAGE) 500 MG tablet  Patient-reported dosage and instructions? 2 tablets daily  How many days do you have left? 7  Preferred Pharmacy? [id number]  Pharmacy phone number (if available)? [phone]  Additional Information for Provider? pt has routine apt scheduled for   9/8/23; she can only come in on fridays and this was the first in-person   apt time available. She will need a prescription to tide her over until   this apt time. ---------------------------------------------------------------------------  --------------,  Name of Medication? lisinopril-hydroCHLOROthiazide (PRINZIDE;ZESTORETIC)   10-12.5 MG per tablet  Patient-reported dosage and instructions? 1 tablet daily  How many days do you have left? 7  Preferred Pharmacy? [address] #[address]  Pharmacy phone number (if available)? [phone]  Additional Information for Provider? pt has routine apt scheduled for   9/8/23; she can only come in on fridays and this was the first in-person   apt time available. She will need a prescription to tide her over until   this apt time. ---------------------------------------------------------------------------  --------------  Carola HUDSON  What is the best way for the office to contact you? OK to leave message on   voicemail  Preferred Call Back Phone Number? [phone]  ---------------------------------------------------------------------------  --------------  SCRIPT ANSWERS  Relationship to Patient?  Self

## 2023-08-18 NOTE — TELEPHONE ENCOUNTER
Last visit: [id number]  Last Med refill: [id number]  Does patient have enough medication for 72 hours: No:     Next Visit Date:  Future Appointments   Date Time Provider [address]   9/8/2023 10:20 AM Sosa Salgado MD [address] [place] [address],[place] [address] Maintenance   Topic Date Due    Lipids  Never done    HIV screen  Never done    Diabetic Alb to Cr ratio (uACR) test  Never done    Diabetic retinal exam  Never done    Hepatitis C screen  Never done    Hepatitis B vaccine (1 of 3 - Risk 3-dose series) Never done    DTaP/Tdap/Td vaccine (1 - Tdap) Never done    Cervical cancer screen  Never done    Shingles vaccine (1 of 2) Never done    COVID-19 Vaccine (2 - Booster for Toshia series) 02/01/2022    GFR test (Diabetes, CKD 3-4, OR last GFR 15-59)  06/21/2023    A1C test (Diabetic or Prediabetic)  06/24/2023    Depression Screen  06/24/2023    Flu vaccine (1) Never done    Diabetic foot exam  09/06/2023    Breast cancer screen  07/08/2024    Colorectal Cancer Screen  07/28/2032    Pneumococcal 0-64 years Vaccine  Completed    Hepatitis A vaccine  Aged Out    Hib vaccine  Aged Out    Meningococcal (ACWY) vaccine  Aged Out       Hemoglobin A1C (%)   Date Value   06/24/2022 8.8             ( goal A1C is < 7)   No components found for: LABMICR  No results found for: LDLCHOLESTEROL, LDLCALC    (goal LDL is <100)   AST (U/L)   Date Value   06/21/2022 179 (H)     ALT (U/L)   Date Value   06/21/2022 59 (H)     BUN (mg/dL)   Date Value   06/21/2022 8     BP Readings from Last 3 Encounters:   04/12/23 (!) 148/92   09/06/22 (!) 158/100   07/28/22 (!) 146/84          (goal 120/80)    All Future Testing planned in [facility]  Lab Frequency Next Occurrence               Patient Active Problem List:     Diabetes mellitus ([address])     Primary hypertension     NUD (nonulcer dyspepsia)     Hemorrhoids, internal     Gastroesophageal reflux disease     Irritable bowel syndrome with both constipation and diarrhea     Bunion of left foot

## 2023-08-20 RX ORDER — LISINOPRIL AND HYDROCHLOROTHIAZIDE 12.5; 1 MG/1; MG/1
1 TABLET ORAL DAILY
Qty: 30 TABLET | Refills: 0 | Status: SHIPPED | OUTPATIENT
Start: 2023-08-20 | End: 2023-09-19

## 2023-09-15 ENCOUNTER — OFFICE VISIT (OUTPATIENT)
Dept: FAMILY MEDICINE CLINIC | Age: 58
End: 2023-09-15
Payer: COMMERCIAL

## 2023-09-15 VITALS
BODY MASS INDEX: 21.08 KG/M2 | SYSTOLIC BLOOD PRESSURE: 120 MMHG | WEIGHT: 119 LBS | TEMPERATURE: 98.3 F | HEART RATE: 73 BPM | DIASTOLIC BLOOD PRESSURE: 70 MMHG

## 2023-09-15 DIAGNOSIS — G47.00 INSOMNIA, UNSPECIFIED TYPE: ICD-10-CM

## 2023-09-15 DIAGNOSIS — Z23 IMMUNIZATION DUE: ICD-10-CM

## 2023-09-15 DIAGNOSIS — I10 PRIMARY HYPERTENSION: Primary | ICD-10-CM

## 2023-09-15 DIAGNOSIS — K64.8 HEMORRHOIDS, INTERNAL: ICD-10-CM

## 2023-09-15 DIAGNOSIS — E11.69 TYPE 2 DIABETES MELLITUS WITH OTHER SPECIFIED COMPLICATION, WITHOUT LONG-TERM CURRENT USE OF INSULIN (HCC): ICD-10-CM

## 2023-09-15 LAB — HBA1C MFR BLD: 5.2 %

## 2023-09-15 PROCEDURE — 83036 HEMOGLOBIN GLYCOSYLATED A1C: CPT

## 2023-09-15 PROCEDURE — 90686 IIV4 VACC NO PRSV 0.5 ML IM: CPT | Performed by: FAMILY MEDICINE

## 2023-09-15 RX ORDER — UREA 10 %
1 LOTION (ML) TOPICAL NIGHTLY PRN
Qty: 30 TABLET | Refills: 1 | Status: SHIPPED | OUTPATIENT
Start: 2023-09-15

## 2023-09-15 RX ORDER — HYDROCORTISONE 25 MG/G
CREAM TOPICAL
Qty: 1 EACH | Refills: 1 | Status: SHIPPED | OUTPATIENT
Start: 2023-09-15

## 2023-09-15 RX ORDER — LISINOPRIL AND HYDROCHLOROTHIAZIDE 12.5; 1 MG/1; MG/1
1 TABLET ORAL DAILY
Qty: 30 TABLET | Refills: 3 | Status: SHIPPED | OUTPATIENT
Start: 2023-09-15 | End: 2023-12-14

## 2023-09-15 NOTE — PROGRESS NOTES
Attending Physician Statement  I have discussed the care of [facility] pertinent history and exam findings,  with the resident. I have reviewed the key elements of all parts of the encounter with the resident. I agree with the assessment, plan and orders as documented by the resident.   (GE Modifier)    DM- well controlled A1c 5.2/Labs ordered  HTN- well controlled

## 2023-09-15 NOTE — PROGRESS NOTES
5. 25Visit Information    Have you changed or started any medications since your last visit including any over-the-counter medicines, vitamins, or herbal medicines? no   Have you stopped taking any of your medications? Is so, why? -  no  Are you having any side effects from any of your medications? - no    Have you seen any other physician or provider since your last visit?  no   Have you had any other diagnostic tests since your last visit?  no   Have you been seen in the emergency room and/or had an admission in a hospital since we last saw you?  no   Have you had your routine dental cleaning in the past 6 months?  no     Do you have an active MyChart account? If no, what is the barrier?   No:     Patient Care Team:  Diana Young MD as PCP - General (Family Medicine)    Medical History Review  Past Medical, Family, and Social History reviewed and does not contribute to the patient presenting condition    Health Maintenance   Topic Date Due    Hepatitis B vaccine (1 of 3 - 3-dose series) Never done    Pneumococcal 0-64 years Vaccine (1 - PCV) Never done    Lipids  Never done    HIV screen  Never done    Diabetic Alb to Cr ratio (uACR) test  Never done    Diabetic retinal exam  Never done    Hepatitis C screen  Never done    DTaP/Tdap/Td vaccine (1 - Tdap) Never done    Cervical cancer screen  Never done    Shingles vaccine (1 of 2) Never done    COVID-19 Vaccine (2 - Booster for Toshia series) 02/01/2022    GFR test (Diabetes, CKD 3-4, OR last GFR 15-59)  06/21/2023    A1C test (Diabetic or Prediabetic)  06/24/2023    Depression Screen  06/24/2023    Flu vaccine (1) Never done    Diabetic foot exam  09/06/2023    Breast cancer screen  07/08/2024    Colorectal Cancer Screen  07/28/2032    Hepatitis A vaccine  Aged Out    Hib vaccine  Aged Out    Meningococcal (ACWY) vaccine  Aged Out

## 2023-10-27 DIAGNOSIS — K21.9 GASTROESOPHAGEAL REFLUX DISEASE, UNSPECIFIED WHETHER ESOPHAGITIS PRESENT: ICD-10-CM

## 2023-10-27 DIAGNOSIS — K30 NUD (NONULCER DYSPEPSIA): ICD-10-CM

## 2023-10-27 DIAGNOSIS — K58.2 IRRITABLE BOWEL SYNDROME WITH BOTH CONSTIPATION AND DIARRHEA: ICD-10-CM

## 2023-10-27 RX ORDER — FAMOTIDINE 10 MG
10 TABLET ORAL 2 TIMES DAILY
Qty: 60 TABLET | Refills: 3 | Status: SHIPPED | OUTPATIENT
Start: 2023-10-27

## 2023-10-27 NOTE — TELEPHONE ENCOUNTER
Last visit:   Last Med refill:   Does patient have enough medication for 72 hours: No:     Next Visit Date:  Future Appointments   Date Time Provider [address]   12/21/2023  1:20 PM Elzbieta Gaona MD [address] [place] [address],[place] [address] Maintenance   Topic Date Due    Hepatitis B vaccine (1 of 3 - 3-dose series) Never done    Pneumococcal 0-64 years Vaccine (1 - PCV) Never done    Lipids  Never done    HIV screen  Never done    Diabetic Alb to Cr ratio (uACR) test  Never done    Diabetic retinal exam  Never done    Hepatitis C screen  Never done    DTaP/Tdap/Td vaccine (1 - Tdap) Never done    Cervical cancer screen  Never done    Shingles vaccine (1 of 2) Never done    COVID-19 Vaccine (2 - Booster for Toshia series) 02/01/2022    GFR test (Diabetes, CKD 3-4, OR last GFR 15-59)  06/21/2023    Depression Screen  06/24/2023    Diabetic foot exam  09/06/2023    Breast cancer screen  07/08/2024    A1C test (Diabetic or Prediabetic)  09/15/2024    Colorectal Cancer Screen  07/28/2032    Flu vaccine  Completed    Hepatitis A vaccine  Aged Out    Hib vaccine  Aged Out    Meningococcal (ACWY) vaccine  Aged Out       Hemoglobin A1C (%)   Date Value   09/15/2023 5.2   06/24/2022 8.8             ( goal A1C is < 7)   No components found for: \"LABMICR\"  No results found for: \"LDLCHOLESTEROL\", \"LDLCALC\"    (goal LDL is <100)   AST (U/L)   Date Value   06/21/2022 179 (H)     ALT (U/L)   Date Value   06/21/2022 59 (H)     BUN (mg/dL)   Date Value   06/21/2022 8     BP Readings from Last 3 Encounters:   09/15/23 120/70   04/12/23 (!) 148/92   09/06/22 (!) 158/100          (goal 120/80)    All Future Testing planned in [facility]  Lab Frequency Next Occurrence   Microalbumin, Ur Once 10/15/2023   Lipid Panel Once 67/26/4832   Basic Metabolic Panel Once 39/96/5166   CBC with Auto Differential Once 09/15/2023               Patient Active Problem List:     Diabetes mellitus ([address])     Primary hypertension     NUD (nonulcer

## 2024-01-25 ENCOUNTER — OFFICE VISIT (OUTPATIENT)
Dept: FAMILY MEDICINE CLINIC | Age: 59
End: 2024-01-25
Payer: COMMERCIAL

## 2024-01-25 VITALS
BODY MASS INDEX: 18.96 KG/M2 | HEART RATE: 67 BPM | DIASTOLIC BLOOD PRESSURE: 71 MMHG | WEIGHT: 118 LBS | HEIGHT: 66 IN | SYSTOLIC BLOOD PRESSURE: 117 MMHG

## 2024-01-25 DIAGNOSIS — K58.2 IRRITABLE BOWEL SYNDROME WITH BOTH CONSTIPATION AND DIARRHEA: ICD-10-CM

## 2024-01-25 DIAGNOSIS — E11.69 TYPE 2 DIABETES MELLITUS WITH OTHER SPECIFIED COMPLICATION, WITHOUT LONG-TERM CURRENT USE OF INSULIN (HCC): Primary | ICD-10-CM

## 2024-01-25 DIAGNOSIS — K30 NUD (NONULCER DYSPEPSIA): ICD-10-CM

## 2024-01-25 DIAGNOSIS — K64.8 HEMORRHOIDS, INTERNAL: ICD-10-CM

## 2024-01-25 DIAGNOSIS — I10 PRIMARY HYPERTENSION: ICD-10-CM

## 2024-01-25 DIAGNOSIS — K21.9 GASTROESOPHAGEAL REFLUX DISEASE, UNSPECIFIED WHETHER ESOPHAGITIS PRESENT: ICD-10-CM

## 2024-01-25 DIAGNOSIS — N95.1 HOT FLASHES DUE TO MENOPAUSE: ICD-10-CM

## 2024-01-25 LAB — HBA1C MFR BLD: 5.4 %

## 2024-01-25 PROCEDURE — G8427 DOCREV CUR MEDS BY ELIG CLIN: HCPCS

## 2024-01-25 PROCEDURE — 83036 HEMOGLOBIN GLYCOSYLATED A1C: CPT

## 2024-01-25 PROCEDURE — 3078F DIAST BP <80 MM HG: CPT

## 2024-01-25 PROCEDURE — G8420 CALC BMI NORM PARAMETERS: HCPCS

## 2024-01-25 PROCEDURE — G8482 FLU IMMUNIZE ORDER/ADMIN: HCPCS

## 2024-01-25 PROCEDURE — 2022F DILAT RTA XM EVC RTNOPTHY: CPT

## 2024-01-25 PROCEDURE — 4004F PT TOBACCO SCREEN RCVD TLK: CPT

## 2024-01-25 PROCEDURE — 99213 OFFICE O/P EST LOW 20 MIN: CPT

## 2024-01-25 PROCEDURE — 3074F SYST BP LT 130 MM HG: CPT

## 2024-01-25 PROCEDURE — 3044F HG A1C LEVEL LT 7.0%: CPT

## 2024-01-25 PROCEDURE — 3017F COLORECTAL CA SCREEN DOC REV: CPT

## 2024-01-25 RX ORDER — DICYCLOMINE HYDROCHLORIDE 10 MG/1
10 CAPSULE ORAL EVERY 6 HOURS PRN
Qty: 30 CAPSULE | Refills: 2 | Status: SHIPPED | OUTPATIENT
Start: 2024-01-25 | End: 2024-02-24

## 2024-01-25 RX ORDER — LISINOPRIL AND HYDROCHLOROTHIAZIDE 12.5; 1 MG/1; MG/1
1 TABLET ORAL DAILY
Qty: 30 TABLET | Refills: 2 | Status: SHIPPED | OUTPATIENT
Start: 2024-01-25 | End: 2024-04-24

## 2024-01-25 RX ORDER — HYDROCORTISONE 25 MG/G
CREAM TOPICAL
Qty: 1 EACH | Refills: 1 | Status: SHIPPED | OUTPATIENT
Start: 2024-01-25

## 2024-01-25 RX ORDER — FAMOTIDINE 10 MG
10 TABLET ORAL 2 TIMES DAILY
Qty: 60 TABLET | Refills: 3 | Status: SHIPPED | OUTPATIENT
Start: 2024-01-25

## 2024-01-25 RX ORDER — PAROXETINE 10 MG/1
10 TABLET, FILM COATED ORAL NIGHTLY
Qty: 30 TABLET | Refills: 3 | Status: SHIPPED | OUTPATIENT
Start: 2024-01-25

## 2024-01-25 SDOH — ECONOMIC STABILITY: FOOD INSECURITY: WITHIN THE PAST 12 MONTHS, THE FOOD YOU BOUGHT JUST DIDN'T LAST AND YOU DIDN'T HAVE MONEY TO GET MORE.: NEVER TRUE

## 2024-01-25 SDOH — ECONOMIC STABILITY: INCOME INSECURITY: HOW HARD IS IT FOR YOU TO PAY FOR THE VERY BASICS LIKE FOOD, HOUSING, MEDICAL CARE, AND HEATING?: NOT HARD AT ALL

## 2024-01-25 SDOH — ECONOMIC STABILITY: FOOD INSECURITY: WITHIN THE PAST 12 MONTHS, YOU WORRIED THAT YOUR FOOD WOULD RUN OUT BEFORE YOU GOT MONEY TO BUY MORE.: NEVER TRUE

## 2024-01-25 SDOH — ECONOMIC STABILITY: HOUSING INSECURITY
IN THE LAST 12 MONTHS, WAS THERE A TIME WHEN YOU DID NOT HAVE A STEADY PLACE TO SLEEP OR SLEPT IN A SHELTER (INCLUDING NOW)?: NO

## 2024-01-25 ASSESSMENT — ENCOUNTER SYMPTOMS
SHORTNESS OF BREATH: 0
ABDOMINAL PAIN: 0
COUGH: 0
WHEEZING: 0

## 2024-01-25 ASSESSMENT — ANXIETY QUESTIONNAIRES
6. BECOMING EASILY ANNOYED OR IRRITABLE: 3
GAD7 TOTAL SCORE: 20
5. BEING SO RESTLESS THAT IT IS HARD TO SIT STILL: 3
3. WORRYING TOO MUCH ABOUT DIFFERENT THINGS: 3
7. FEELING AFRAID AS IF SOMETHING AWFUL MIGHT HAPPEN: 2
2. NOT BEING ABLE TO STOP OR CONTROL WORRYING: 3
4. TROUBLE RELAXING: 3
1. FEELING NERVOUS, ANXIOUS, OR ON EDGE: 3

## 2024-01-25 ASSESSMENT — PATIENT HEALTH QUESTIONNAIRE - PHQ9
2. FEELING DOWN, DEPRESSED OR HOPELESS: 0
SUM OF ALL RESPONSES TO PHQ QUESTIONS 1-9: 0
SUM OF ALL RESPONSES TO PHQ9 QUESTIONS 1 & 2: 0
SUM OF ALL RESPONSES TO PHQ QUESTIONS 1-9: 0

## 2024-01-25 NOTE — PROGRESS NOTES
[facility] Residency Program - Outpatient Note      Subjective:    Mary Beth Austin is a 58 y.o. female with  has a past medical history of Diabetes mellitus (HCC), Snores, and Wellness examination.    Presented to the office today for:  Chief Complaint   Patient presents with    3 Month Follow-Up     DM - HTN    Health Maintenance     Decline vaccines       HPI    A1c was 5.2 in 9/15/2023- repeat today was= 5.4  Has not done her BW yet- will go tomorrow    /71- stable on lisinopril/hctz    MALINA 7- 20  Hot flashes- someone gave her melatonin? States it is not helping    Still smoking- not motivated to quit    Review of Systems   Constitutional:  Negative for chills and fever.   HENT:  Negative for congestion.    Respiratory:  Negative for cough, shortness of breath and wheezing.    Cardiovascular:  Negative for chest pain, palpitations and leg swelling.   Gastrointestinal:  Negative for abdominal pain.   Genitourinary:  Negative for dysuria.   Musculoskeletal:  Negative for back pain.   Neurological:  Negative for dizziness, weakness, light-headedness, numbness and headaches.     The patient has a   Family History   Problem Relation Age of Onset    Diabetes Mother        Objective:    /71   Pulse 67   Ht 1.676 m (5' 6\")   Wt 53.5 kg (118 lb)   BMI 19.05 kg/m²    BP Readings from Last 3 Encounters:   01/25/24 117/71   09/15/23 120/70   04/12/23 (!) 148/92       Physical Exam  Constitutional:       General: She is not in acute distress.     Appearance: She is not ill-appearing.   Cardiovascular:      Rate and Rhythm: Normal rate and regular rhythm.      Pulses: Normal pulses.      Heart sounds: Normal heart sounds.   Pulmonary:      Effort: Pulmonary effort is normal.      Breath sounds: Normal breath sounds.   Abdominal:      Palpations: Abdomen is soft.   Musculoskeletal:      Right lower leg: No edema.      Left lower leg: No edema.   Neurological:      Mental

## 2024-01-25 NOTE — PROGRESS NOTES
Visit Information    Have you changed or started any medications since your last visit including any over-the-counter medicines, vitamins, or herbal medicines? no   Have you stopped taking any of your medications? Is so, why? -  no  Are you having any side effects from any of your medications? - no    Have you seen any other physician or provider since your last visit?  no   Have you had any other diagnostic tests since your last visit?  no   Have you been seen in the emergency room and/or had an admission in a hospital since we last saw you?  no   Have you had your routine dental cleaning in the past 6 months?  no     Do you have an active MyChart account? If no, what is the barrier?  No: Declined    Patient Care Team:  Hillary Kumari MD as PCP - General (Family Medicine)    Medical History Review  Past Medical, Family, and Social History reviewed and does not contribute to the patient presenting condition    Health Maintenance   Topic Date Due    Hepatitis B vaccine (1 of 3 - 3-dose series) Never done    Lipids  Never done    HIV screen  Never done    Diabetic Alb to Cr ratio (uACR) test  Never done    Diabetic retinal exam  Never done    Hepatitis C screen  Never done    DTaP/Tdap/Td vaccine (1 - Tdap) Never done    Cervical cancer screen  Never done    Pneumococcal 0-64 years Vaccine (2 - PCV) 04/06/2013    Shingles vaccine (1 of 2) Never done    GFR test (Diabetes, CKD 3-4, OR last GFR 15-59)  06/21/2023    Depression Screen  06/24/2023    COVID-19 Vaccine (2 - 2023-24 season) 09/01/2023    Diabetic foot exam  09/06/2023    Breast cancer screen  07/08/2024    A1C test (Diabetic or Prediabetic)  09/15/2024    Colorectal Cancer Screen  07/28/2032    Flu vaccine  Completed    Hepatitis A vaccine  Aged Out    Hib vaccine  Aged Out    Polio vaccine  Aged Out    Meningococcal (ACWY) vaccine  Aged Out

## 2024-01-25 NOTE — PROGRESS NOTES
Attending Physician Statement  I have discussed the care of Mary Beth Austin, 58 y.o. female,including pertinent history and exam findings,  with the resident Hillary Fisher MD.  History:  Chief Complaint   Patient presents with    3 Month Follow-Up     DM - HTN    Health Maintenance     Decline vaccines     I have reviewed the key elements of the encounter with the resident. Examination was done by resident as documented in residents note.  BP Readings from Last 3 Encounters:   01/25/24 117/71   09/15/23 120/70   04/12/23 (!) 148/92     /71   Pulse 67   Ht 1.676 m (5' 6\")   Wt 53.5 kg (118 lb)   BMI 19.05 kg/m²   Lab Results   Component Value Date    WBC 4.7 06/21/2022    HGB 11.3 (L) 06/21/2022    HCT 33.5 (L) 06/21/2022     06/21/2022    ALT 59 (H) 06/21/2022     (H) 06/21/2022     (L) 06/21/2022    K 4.1 06/21/2022     06/21/2022    CREATININE 0.54 06/21/2022    BUN 8 06/21/2022    CO2 17 (L) 06/21/2022    LABA1C 5.4 01/25/2024     Lab Results   Component Value Date    CALCIUM 8.3 (L) 06/21/2022     No results found for: \"LDLCALC\", \"LDLCHOLESTEROL\", \"LDLDIRECT\"  I agree with the assessment, plan and diagnosis of    Diagnosis Orders   1. Type 2 diabetes mellitus with other specified complication, without long-term current use of insulin (HCC)  POCT glycosylated hemoglobin (Hb A1C)      2. Irritable bowel syndrome with both constipation and diarrhea  dicyclomine (BENTYL) 10 MG capsule      3. Hemorrhoids, internal  hydrocortisone (ANUSOL-HC) 2.5 % CREA rectal cream      4. NUD (nonulcer dyspepsia)  famotidine (PEPCID) 10 MG tablet      5. Gastroesophageal reflux disease, unspecified whether esophagitis present  famotidine (PEPCID) 10 MG tablet      6. Primary hypertension  lisinopril-hydroCHLOROthiazide (PRINZIDE;ZESTORETIC) 10-12.5 MG per tablet      7. Hot flashes due to menopause  PARoxetine (PAXIL) 10 MG tablet        I agree with  orders as documented by the

## 2024-04-24 ENCOUNTER — HOSPITAL ENCOUNTER (EMERGENCY)
Age: 59
Discharge: HOME | End: 2024-04-24
Attending: EMERGENCY MEDICINE
Payer: COMMERCIAL

## 2024-04-24 VITALS
DIASTOLIC BLOOD PRESSURE: 77 MMHG | TEMPERATURE: 97.3 F | RESPIRATION RATE: 16 BRPM | BODY MASS INDEX: 20.6 KG/M2 | HEART RATE: 70 BPM | OXYGEN SATURATION: 98 % | SYSTOLIC BLOOD PRESSURE: 126 MMHG | WEIGHT: 127.65 LBS

## 2024-04-24 DIAGNOSIS — K08.89 PAIN, DENTAL: Primary | ICD-10-CM

## 2024-04-24 PROCEDURE — 99283 EMERGENCY DEPT VISIT LOW MDM: CPT

## 2024-04-24 PROCEDURE — 6370000000 HC RX 637 (ALT 250 FOR IP): Performed by: EMERGENCY MEDICINE

## 2024-04-24 RX ORDER — IBUPROFEN 800 MG/1
800 TABLET ORAL ONCE
Status: COMPLETED | OUTPATIENT
Start: 2024-04-24 | End: 2024-04-24

## 2024-04-24 RX ORDER — IBUPROFEN 800 MG/1
800 TABLET ORAL EVERY 8 HOURS PRN
Qty: 30 TABLET | Refills: 0 | Status: SHIPPED | OUTPATIENT
Start: 2024-04-24

## 2024-04-24 RX ORDER — PENICILLIN V POTASSIUM 500 MG/1
500 TABLET ORAL 3 TIMES DAILY
Qty: 20 TABLET | Refills: 0 | Status: SHIPPED | OUTPATIENT
Start: 2024-04-24 | End: 2024-05-01

## 2024-04-24 RX ORDER — PENICILLIN V POTASSIUM 250 MG/1
500 TABLET ORAL ONCE
Status: COMPLETED | OUTPATIENT
Start: 2024-04-24 | End: 2024-04-24

## 2024-04-24 RX ADMIN — IBUPROFEN 800 MG: 800 TABLET, FILM COATED ORAL at 09:24

## 2024-04-24 RX ADMIN — PENICILLIN V POTASSIUM 500 MG: 250 TABLET ORAL at 09:24

## 2024-04-24 ASSESSMENT — ENCOUNTER SYMPTOMS
RHINORRHEA: 0
SHORTNESS OF BREATH: 0
FACIAL SWELLING: 1
TROUBLE SWALLOWING: 0
VOMITING: 0
SORE THROAT: 0
COUGH: 0
NAUSEA: 0
COLOR CHANGE: 0

## 2024-04-24 ASSESSMENT — LIFESTYLE VARIABLES
HOW OFTEN DO YOU HAVE A DRINK CONTAINING ALCOHOL: NEVER
HOW MANY STANDARD DRINKS CONTAINING ALCOHOL DO YOU HAVE ON A TYPICAL DAY: PATIENT DOES NOT DRINK

## 2024-04-24 NOTE — ED TRIAGE NOTES
Pt presents to the ER with pain in her right upper gum/tooth area. Pt states she doesn't have a dentist. Pt states she has a abscess in the right gum.  Pt states the pain is bad and is causing her a headache. Pt reports trying tylenol without no relief. Pt denies fevers

## 2024-04-24 NOTE — ED PROVIDER NOTES
[facility] ED  eMERGENCY dEPARTMENT eNCOUnter      Pt Name: Mary Beth Austin  MRN: [id number]  Birthdate 1965  Date of evaluation: 24      CHIEF COMPLAINT       Chief Complaint   Patient presents with    Mouth Lesions         HISTORY OF PRESENT ILLNESS    Mary Beth Austin is a 58 y.o. female who presents with right upper dental pain that she has had for the past few days.  She says she feels as though she has having some swelling to the area as well.  She denies fever, chills.  She denies difficulty breathing or swallowing.  She says she does not currently have a dentist.  Patient says she has not been taking anything for the pain.    Location/Symptom: dental pain  Timing/Onset: a few days  Context/Setting: no significant hx  Quality: achy  Duration: a few days  Modifying Factors: none  Severity: moderate      REVIEW OF SYSTEMS       Review of Systems   Constitutional:  Negative for chills and fever.   HENT:  Positive for dental problem and facial swelling. Negative for ear pain, rhinorrhea, sore throat and trouble swallowing.    Respiratory:  Negative for cough and shortness of breath.    Cardiovascular:  Negative for chest pain.   Gastrointestinal:  Negative for nausea and vomiting.   Musculoskeletal:  Negative for neck pain.   Skin:  Negative for color change and rash.   Neurological:  Positive for headaches. Negative for dizziness.   Psychiatric/Behavioral:  The patient is not nervous/anxious.        PAST MEDICAL HISTORY    has a past medical history of Diabetes mellitus (HCC), Snores, and Wellness examination.    SURGICAL HISTORY      has a past surgical history that includes  section (N/A); Colonoscopy (2022); and Colonoscopy (N/A, 2022).    CURRENT MEDICATIONS       Discharge Medication List as of 2024  9:42 AM        CONTINUE these medications which have NOT CHANGED    Details   dicyclomine (BENTYL) 10 MG capsule Take 1 capsule by mouth every 6 hours as needed  (cramps), Disp-30 capsule, R-2Normal      hydrocortisone (ANUSOL-HC) 2.5 % CREA rectal cream Apply to rectum twice daily, Disp-1 each, R-1, Normal      famotidine (PEPCID) 10 MG tablet Take 1 tablet by mouth 2 times daily, Disp-60 tablet, R-3Normal      lisinopril-hydroCHLOROthiazide (PRINZIDE;ZESTORETIC) 10-12.5 MG per tablet Take 1 tablet by mouth daily, Disp-30 tablet, R-2Normal      PARoxetine (PAXIL) 10 MG tablet Take 1 tablet by mouth at bedtime Take 1 tablet NIGHTLY, Disp-30 tablet, R-3Normal      metFORMIN (GLUCOPHAGE) 500 MG tablet Take 1 tablet by mouth 2 times daily (with meals), Disp-60 tablet, R-3Normal      melatonin 1 MG tablet Take 1 tablet by mouth nightly as needed for Sleep, Disp-30 tablet, R-1Normal             ALLERGIES     has No Known Allergies.    FAMILY HISTORY     She indicated that her mother is . She indicated that her father is .     family history includes Diabetes in her mother.    SOCIAL HISTORY      reports that she has been smoking cigarettes. She has never been exposed to tobacco smoke. She has never used smokeless tobacco. She reports current alcohol use of about 6.0 standard drinks of alcohol per week. She reports current drug use. Drug: Marijuana (Weed).    PHYSICAL EXAM     INITIAL VITALS:  weight is 57.9 kg (127 lb 10.3 oz). Her oral temperature is 97.3 °F (36.3 °C). Her blood pressure is 126/77 and her pulse is 70. Her respiration is 16 and oxygen saturation is 98%.      Physical Exam  Vitals and nursing note reviewed.   Constitutional:       Appearance: Normal appearance.      Comments: Patient is lying in the bed and appears mildly uncomfortable   HENT:      Head: Normocephalic and atraumatic.      Mouth/Throat:      Mouth: Mucous membranes are moist.      Comments: Patient does have poor dentition.  There is no dental abscess.  There is mild edema to the right maxillary area without any overlying erythema or warmth.  There is no submandibular edema.

## 2024-05-08 ENCOUNTER — OFFICE VISIT (OUTPATIENT)
Dept: PODIATRY | Age: 59
End: 2024-05-08
Payer: COMMERCIAL

## 2024-05-08 VITALS — BODY MASS INDEX: 22.5 KG/M2 | HEIGHT: 63 IN | WEIGHT: 127 LBS

## 2024-05-08 DIAGNOSIS — M77.42 METATARSALGIA OF LEFT FOOT: ICD-10-CM

## 2024-05-08 DIAGNOSIS — E11.69 TYPE 2 DIABETES MELLITUS WITH OTHER SPECIFIED COMPLICATION, WITHOUT LONG-TERM CURRENT USE OF INSULIN (HCC): ICD-10-CM

## 2024-05-08 DIAGNOSIS — M20.5X2 HALLUX LIMITUS OF LEFT FOOT: ICD-10-CM

## 2024-05-08 DIAGNOSIS — M21.612 BUNION OF LEFT FOOT: ICD-10-CM

## 2024-05-08 DIAGNOSIS — L84 CORNS AND CALLUS: Primary | ICD-10-CM

## 2024-05-08 DIAGNOSIS — M79.672 LEFT FOOT PAIN: ICD-10-CM

## 2024-05-08 PROCEDURE — G8427 DOCREV CUR MEDS BY ELIG CLIN: HCPCS

## 2024-05-08 PROCEDURE — 11055 PARING/CUTG B9 HYPRKER LES 1: CPT

## 2024-05-08 PROCEDURE — 4004F PT TOBACCO SCREEN RCVD TLK: CPT

## 2024-05-08 PROCEDURE — 11721 DEBRIDE NAIL 6 OR MORE: CPT

## 2024-05-08 PROCEDURE — 99214 OFFICE O/P EST MOD 30 MIN: CPT

## 2024-05-08 PROCEDURE — 3044F HG A1C LEVEL LT 7.0%: CPT

## 2024-05-08 PROCEDURE — G8420 CALC BMI NORM PARAMETERS: HCPCS

## 2024-05-08 PROCEDURE — 3017F COLORECTAL CA SCREEN DOC REV: CPT

## 2024-05-08 PROCEDURE — 99213 OFFICE O/P EST LOW 20 MIN: CPT | Performed by: PODIATRIST

## 2024-05-08 PROCEDURE — 2022F DILAT RTA XM EVC RTNOPTHY: CPT

## 2024-05-08 RX ORDER — UREA 40 %
CREAM (GRAM) TOPICAL
Qty: 85 G | Refills: 3 | Status: SHIPPED | OUTPATIENT
Start: 2024-05-08

## 2024-05-08 NOTE — PROGRESS NOTES
Patient instructed to remove shoes and socks and instructed to sit in exam chair.  Current PCP is Hillary Kumari MD and date of last visit was 001/25/2024.   Do you have a follow up visit scheduled?  No  If yes, the date is       Diabetic visit information    Blood pressure (Control is BP <140/90)  BP Readings from Last 3 Encounters:   04/24/24 126/77   01/25/24 117/71   09/15/23 120/70       BP taken with correct size cuff? - Yes   Repeated if > 140/90 Yes      Tobacco use:  Patient  reports that she has been smoking cigarettes. She has never been exposed to tobacco smoke. She has never used smokeless tobacco.  If Smoker - Cessation materials given?- Yes       Diabetic Health Maintenance Items due  Diabetes Management   Topic Date Due    Lipids  Never done    Diabetic Alb to Cr ratio (uACR) test  Never done    Diabetic retinal exam  Never done    Diabetic foot exam  09/06/2023       Diabetic retinal exam done in last year? - Yes   If No: remind patient that it is due and they should schedule an exam    Medications  Is patient taking any medications for diabetes? -   Yes  Have blood sugars been controlled?   Fasting blood sugars under 120   -   Yes   Random home sugars or today's POCT glucose is under 180 -   Yes   []  If No to the above then patient should schedule appt with PCP.     Diabetic Plan    A1C Plan  Lab Results   Component Value Date    LABA1C 5.4 01/25/2024    LABA1C 5.2 09/15/2023    LABA1C 8.8 06/24/2022      []  If A1C over 8 and last result >3 months ago - Order A1C and refer to PCP   []  If last A1C over 6 months ago - Order A1C and refer to PCP for follow up   []  If elevated blood sugars > 180 - refer to PCP for follow up    []  Blood sugar controlled - A1C under 8 and last check was < 6 months      Cholesterol Plan   No results found for: \"LDLCALC\", \"LDLCHOLESTEROL\"   []  If LDL > 100 and last result >3 months ago - order Fasting lipids and refer to PCP for follow up   []  If LDL < 100 and 
Patient explains that she is currently not working, making it difficult for her to acquire over-the-counter orthotics and different shoe gear.  However she does state that she will try her best to obtain these.  Hyperkeratotic tissue mentioned above was pared down using a dermal curette blade the patient's satisfaction.  Patient expresses immediate relief upon ambulation.  Patient still smoking. Once smoking cessation has been achieved, we will further discuss surgical options to correct her bunion deformity.  At this time we will need to acquire the following for surgical planning:  Weightbearing x-rays of the left foot  Vitamin D studies  Preadmission testing studies  Any medical clearance needed  WB x-rays of left foot ordered for increased left foot pain and edema.   Nails 1-10 debrided with sterile nail nipper without incident.   Uric acid ordered for left foot pain for gout workup. Pt has family history of gout.  Order placed for urea cream for left foot callus.  Patient will return to clinic in 3 months for regular diabetic foot exams and paring of callus.  Discussed with Dr. Lynch.    Orders Placed This Encounter   Medications    Urea (CARMOL) 40 % cream     Sig: Apply to callus 2 times per day in morning and night.     Dispense:  85 g     Refill:  3     Orders Placed This Encounter   Procedures    XR FOOT LEFT (MIN 3 VIEWS)     Weightbearing x-rays please     Standing Status:   Future     Standing Expiration Date:   5/8/2025     Order Specific Question:   Reason for exam:     Answer:   worsening pain and swelling left foot at 1st MPJ    Uric Acid     Standing Status:   Future     Standing Expiration Date:   5/8/2025       Elena Manzo DPM   Podiatric Medicine & Surgery   5/8/2024 at 5:07 PM        I performed a history and physical examination of the patient and discussed management with the resident. I reviewed the resident’s note and agree with the documented findings and plan of care. Any

## 2024-05-28 DIAGNOSIS — I10 PRIMARY HYPERTENSION: ICD-10-CM

## 2024-05-28 RX ORDER — LISINOPRIL AND HYDROCHLOROTHIAZIDE 12.5; 1 MG/1; MG/1
1 TABLET ORAL DAILY
Qty: 30 TABLET | Refills: 2 | Status: SHIPPED | OUTPATIENT
Start: 2024-05-28 | End: 2024-08-26

## 2024-05-28 NOTE — TELEPHONE ENCOUNTER
Last visit: 01/25/2024  Last Med refill: 01/25/2024  Does patient have enough medication for 72 hours: No:     Next Visit Date:  No future appointments.    Health Maintenance   Topic Date Due    Hepatitis B vaccine (1 of 3 - 3-dose series) Never done    Lipids  Never done    HIV screen  Never done    Diabetic Alb to Cr ratio (uACR) test  Never done    Diabetic retinal exam  Never done    Hepatitis C screen  Never done    DTaP/Tdap/Td vaccine (1 - Tdap) Never done    Cervical cancer screen  Never done    Pneumococcal 0-64 years Vaccine (2 of 2 - PCV) 04/06/2013    Shingles vaccine (1 of 2) Never done    GFR test (Diabetes, CKD 3-4, OR last GFR 15-59)  06/21/2023    COVID-19 Vaccine (2 - 2023-24 season) 09/01/2023    Diabetic foot exam  09/06/2023    Breast cancer screen  07/08/2024    A1C test (Diabetic or Prediabetic)  01/25/2025    Depression Screen  01/25/2025    Colorectal Cancer Screen  07/28/2032    Flu vaccine  Completed    Hepatitis A vaccine  Aged Out    Hib vaccine  Aged Out    Polio vaccine  Aged Out    Meningococcal (ACWY) vaccine  Aged Out       Hemoglobin A1C (%)   Date Value   01/25/2024 5.4   09/15/2023 5.2   06/24/2022 8.8             ( goal A1C is < 7)   No components found for: \"LABMICR\"  No components found for: \"LDLCHOLESTEROL\", \"LDLCALC\"    (goal LDL is <100)   AST (U/L)   Date Value   06/21/2022 179 (H)     ALT (U/L)   Date Value   06/21/2022 59 (H)     BUN (mg/dL)   Date Value   06/21/2022 8     BP Readings from Last 3 Encounters:   04/24/24 126/77   01/25/24 117/71   09/15/23 120/70          (goal 120/80)    All Future Testing planned in [facility]  Lab Frequency Next Occurrence   Microalbumin, Ur Once 10/15/2023   Lipid Panel Once 09/15/2023   Basic Metabolic Panel Once 09/15/2023   CBC with Auto Differential Once 09/15/2023   XR FOOT LEFT (MIN 3 VIEWS) Once 05/08/2024   Uric Acid Once 05/08/2024               Patient Active Problem List:     Diabetes mellitus (HCC)     Primary hypertension

## 2024-07-06 DIAGNOSIS — E11.69 TYPE 2 DIABETES MELLITUS WITH OTHER SPECIFIED COMPLICATION, WITHOUT LONG-TERM CURRENT USE OF INSULIN (HCC): ICD-10-CM

## 2024-07-08 NOTE — TELEPHONE ENCOUNTER
E-scribe request for METFORMIN. Please review and e-scribe if applicable.     Last Visit Date:  1/25/24  Next Visit Date:  Visit date not found    Hemoglobin A1C (%)   Date Value   01/25/2024 5.4   09/15/2023 5.2   06/24/2022 8.8             ( goal A1C is < 7)   No components found for: \"LABMICR\"  No components found for: \"LDLCHOLESTEROL\", \"LDLCALC\"    (goal LDL is <100)   AST (U/L)   Date Value   06/21/2022 179 (H)     ALT (U/L)   Date Value   06/21/2022 59 (H)     BUN (mg/dL)   Date Value   06/21/2022 8     BP Readings from Last 3 Encounters:   04/24/24 126/77   01/25/24 117/71   09/15/23 120/70          (goal 120/80)        Patient Active Problem List:     Diabetes mellitus (HCC)     Primary hypertension     NUD (nonulcer dyspepsia)     Hemorrhoids, internal     Gastroesophageal reflux disease     Irritable bowel syndrome with both constipation and diarrhea     Bunion of left foot      ----JF

## 2024-07-16 DIAGNOSIS — K58.2 IRRITABLE BOWEL SYNDROME WITH BOTH CONSTIPATION AND DIARRHEA: ICD-10-CM

## 2024-07-16 RX ORDER — DICYCLOMINE HYDROCHLORIDE 10 MG/1
10 CAPSULE ORAL EVERY 6 HOURS PRN
Qty: 30 CAPSULE | Refills: 2 | Status: SHIPPED | OUTPATIENT
Start: 2024-07-16 | End: 2024-08-15

## 2024-07-16 NOTE — TELEPHONE ENCOUNTER
Last visit: 1/25/24  Last Med refill:   Does patient have enough medication for 72 hours: No:     Next Visit Date:  No future appointments.    Health Maintenance   Topic Date Due    Hepatitis B vaccine (1 of 3 - 3-dose series) Never done    Lipids  Never done    HIV screen  Never done    Diabetic Alb to Cr ratio (uACR) test  Never done    Diabetic retinal exam  Never done    Hepatitis C screen  Never done    DTaP/Tdap/Td vaccine (1 - Tdap) Never done    Cervical cancer screen  Never done    Pneumococcal 0-64 years Vaccine (2 of 2 - PCV) 04/06/2013    Shingles vaccine (1 of 2) Never done    GFR test (Diabetes, CKD 3-4, OR last GFR 15-59)  06/21/2023    COVID-19 Vaccine (2 - 2023-24 season) 09/01/2023    Diabetic foot exam  09/06/2023    Breast cancer screen  07/08/2024    Flu vaccine (1) 08/01/2024    A1C test (Diabetic or Prediabetic)  01/25/2025    Depression Screen  01/25/2025    Colorectal Cancer Screen  07/28/2032    Hepatitis A vaccine  Aged Out    Hib vaccine  Aged Out    Polio vaccine  Aged Out    Meningococcal (ACWY) vaccine  Aged Out       Hemoglobin A1C (%)   Date Value   01/25/2024 5.4   09/15/2023 5.2   06/24/2022 8.8             ( goal A1C is < 7)   No components found for: \"LABMICR\"  No components found for: \"LDLCHOLESTEROL\", \"LDLCALC\"    (goal LDL is <100)   AST (U/L)   Date Value   06/21/2022 179 (H)     ALT (U/L)   Date Value   06/21/2022 59 (H)     BUN (mg/dL)   Date Value   06/21/2022 8     BP Readings from Last 3 Encounters:   04/24/24 126/77   01/25/24 117/71   09/15/23 120/70          (goal 120/80)    All Future Testing planned in [facility]  Lab Frequency Next Occurrence   Microalbumin, Ur Once 10/15/2023   Lipid Panel Once 09/15/2023   Basic Metabolic Panel Once 09/15/2023   CBC with Auto Differential Once 09/15/2023   XR FOOT LEFT (MIN 3 VIEWS) Once 05/08/2024   Uric Acid Once 05/08/2024               Patient Active Problem List:     Diabetes mellitus (HCC)     Primary hypertension     NUD

## 2024-08-09 ENCOUNTER — HOSPITAL ENCOUNTER (OUTPATIENT)
Age: 59
Setting detail: SPECIMEN
Discharge: HOME | End: 2024-08-09

## 2024-08-09 ENCOUNTER — OFFICE VISIT (OUTPATIENT)
Dept: FAMILY MEDICINE CLINIC | Age: 59
End: 2024-08-09
Payer: COMMERCIAL

## 2024-08-09 VITALS
BODY MASS INDEX: 21.55 KG/M2 | HEART RATE: 58 BPM | WEIGHT: 121.6 LBS | SYSTOLIC BLOOD PRESSURE: 138 MMHG | HEIGHT: 63 IN | DIASTOLIC BLOOD PRESSURE: 88 MMHG

## 2024-08-09 DIAGNOSIS — Z09 HOSPITAL DISCHARGE FOLLOW-UP: Primary | ICD-10-CM

## 2024-08-09 DIAGNOSIS — I10 PRIMARY HYPERTENSION: ICD-10-CM

## 2024-08-09 DIAGNOSIS — M79.672 LEFT FOOT PAIN: ICD-10-CM

## 2024-08-09 DIAGNOSIS — E11.69 TYPE 2 DIABETES MELLITUS WITH OTHER SPECIFIED COMPLICATION, WITHOUT LONG-TERM CURRENT USE OF INSULIN (HCC): ICD-10-CM

## 2024-08-09 LAB
ANION GAP SERPL CALCULATED.3IONS-SCNC: 11 MMOL/L (ref 9–16)
BASOPHILS # BLD: 0.05 K/UL (ref 0–0.2)
BASOPHILS NFR BLD: 1 % (ref 0–2)
BUN SERPL-MCNC: 25 MG/DL (ref 8–23)
CALCIUM SERPL-MCNC: 9.2 MG/DL (ref 8.6–10.4)
CHLORIDE SERPL-SCNC: 106 MMOL/L (ref 98–107)
CHOLEST SERPL-MCNC: 194 MG/DL (ref 0–199)
CHOLESTEROL/HDL RATIO: 3
CO2 SERPL-SCNC: 25 MMOL/L (ref 20–31)
CREAT SERPL-MCNC: 1 MG/DL (ref 0.5–0.9)
CREAT UR-MCNC: 74.5 MG/DL (ref 28–217)
EOSINOPHIL # BLD: 0.15 K/UL (ref 0–0.44)
EOSINOPHILS RELATIVE PERCENT: 3 % (ref 1–4)
ERYTHROCYTE [DISTWIDTH] IN BLOOD BY AUTOMATED COUNT: 12.7 % (ref 11.8–14.4)
GFR, ESTIMATED: 64 ML/MIN/1.73M2
GLUCOSE SERPL-MCNC: 95 MG/DL (ref 74–99)
HCT VFR BLD AUTO: 33.8 % (ref 36.3–47.1)
HDLC SERPL-MCNC: 70 MG/DL
HGB BLD-MCNC: 11 G/DL (ref 11.9–15.1)
IMM GRANULOCYTES # BLD AUTO: <0.03 K/UL (ref 0–0.3)
IMM GRANULOCYTES NFR BLD: 0 %
LDLC SERPL CALC-MCNC: 91 MG/DL (ref 0–100)
LYMPHOCYTES NFR BLD: 3.28 K/UL (ref 1.1–3.7)
LYMPHOCYTES RELATIVE PERCENT: 54 % (ref 24–43)
MCH RBC QN AUTO: 31.4 PG (ref 25.2–33.5)
MCHC RBC AUTO-ENTMCNC: 32.5 G/DL (ref 28.4–34.8)
MCV RBC AUTO: 96.6 FL (ref 82.6–102.9)
MICROALBUMIN UR-MCNC: 58 MG/L (ref 0–20)
MICROALBUMIN/CREAT UR-RTO: 78 MCG/MG CREAT (ref 0–25)
MONOCYTES NFR BLD: 0.53 K/UL (ref 0.1–1.2)
MONOCYTES NFR BLD: 9 % (ref 3–12)
NEUTROPHILS NFR BLD: 33 % (ref 36–65)
NEUTS SEG NFR BLD: 1.95 K/UL (ref 1.5–8.1)
NRBC BLD-RTO: 0 PER 100 WBC
PLATELET # BLD AUTO: 288 K/UL (ref 138–453)
PMV BLD AUTO: 9.4 FL (ref 8.1–13.5)
POTASSIUM SERPL-SCNC: 4.2 MMOL/L (ref 3.7–5.3)
RBC # BLD AUTO: 3.5 M/UL (ref 3.95–5.11)
SODIUM SERPL-SCNC: 142 MMOL/L (ref 136–145)
TRIGL SERPL-MCNC: 165 MG/DL
URATE SERPL-MCNC: 8.7 MG/DL (ref 2.4–5.7)
VLDLC SERPL CALC-MCNC: 33 MG/DL
WBC OTHER # BLD: 6 K/UL (ref 3.5–11.3)

## 2024-08-09 PROCEDURE — 3079F DIAST BP 80-89 MM HG: CPT

## 2024-08-09 PROCEDURE — 99214 OFFICE O/P EST MOD 30 MIN: CPT

## 2024-08-09 PROCEDURE — G8427 DOCREV CUR MEDS BY ELIG CLIN: HCPCS

## 2024-08-09 PROCEDURE — 3075F SYST BP GE 130 - 139MM HG: CPT

## 2024-08-09 PROCEDURE — G8420 CALC BMI NORM PARAMETERS: HCPCS

## 2024-08-09 PROCEDURE — 1111F DSCHRG MED/CURRENT MED MERGE: CPT

## 2024-08-09 PROCEDURE — 4004F PT TOBACCO SCREEN RCVD TLK: CPT

## 2024-08-09 PROCEDURE — 3017F COLORECTAL CA SCREEN DOC REV: CPT

## 2024-08-09 RX ORDER — HYDROCHLOROTHIAZIDE 12.5 MG/1
12.5 CAPSULE, GELATIN COATED ORAL EVERY MORNING
Qty: 90 CAPSULE | Refills: 1 | Status: SHIPPED | OUTPATIENT
Start: 2024-08-09

## 2024-08-09 NOTE — PROGRESS NOTES
Attending Physician Statement  I have discussed the [facility] pertinent [facility] and exam findings,  with the resident. I have reviewed the key elements of all parts of the encounter with the resident.  I agree with the assessment, plan and orders as documented by the resident.  (GE Modifier)    S/p ER FU- Angioedema 2/2 to ACE-I. Start HCTZ  HM- pap next visit

## 2024-08-09 NOTE — PATIENT INSTRUCTIONS
Thank you for letting us take care of you today. We hope all your questions were addressed. If a question was overlooked or something else comes to mind after you return home, please contact a member of your Care Team listed below.      Your Care Team at [facility] is Team #2  Rona Cedeno M.D. (Faculty)  Hillary Kumari M.D. (Resident)  Mendy Bautista M.D. (Resident)  Jeff Todd M.D. (Resident)  Isabelle Sr M.D. (Resident)  Monet Sadler M.D. (Resident)  Medardo López, [facility], [facility]  Nedra Gaytan, JORDY Elias, [facility]  Tamia Bob,  [facility], [facility]  Danay Donohue, [facility]  Angélica Horne, JORDY Rodriguez, [facility]  Agustina (LJ) Melba JORDY (Clinical Practice Manager)  Renetta Sloan [facility] (Clinical Pharmacist)     Office phone number: [phone]    If you need to get in right away due to illness, please be advised we have \"Same Day\" appointments available Monday-Friday. Please call us at [phone] option #3 to schedule your \"Same Day\" appointment.

## 2024-08-09 NOTE — PROGRESS NOTES
Visit Information    Have you changed or started any medications since your last visit including any over-the-counter medicines, vitamins, or herbal medicines? no   Have you stopped taking any of your medications? Is so, why? -  no  Are you having any side effects from any of your medications? - no    Have you seen any other physician or provider since your last visit?  yes - Podiatry   Have you had any other diagnostic tests since your last visit?  no   Have you been seen in the emergency room and/or had an admission in a hospital since we last saw you?  no   Have you had your routine dental cleaning in the past 6 months?  no     Do you have an active MyChart account? If no, what is the barrier?  Yes    Patient Care Team:  Hillary Kumari MD as PCP - General (Family Medicine)    Medical History Review  Past Medical, Family, and Social History reviewed and does contribute to the patient presenting condition    Health Maintenance   Topic Date Due    Lipids  Never done    HIV screen  Never done    Diabetic Alb to Cr ratio (uACR) test  Never done    Diabetic retinal exam  Never done    Hepatitis C screen  Never done    DTaP/Tdap/Td vaccine (1 - Tdap) Never done    Cervical cancer screen  Never done    Pneumococcal 0-64 years Vaccine (2 of 2 - PCV) 04/06/2013    Shingles vaccine (1 of 2) Never done    GFR test (Diabetes, CKD 3-4, OR last GFR 15-59)  06/21/2023    COVID-19 Vaccine (2 - 2023-24 season) 09/01/2023    Diabetic foot exam  09/06/2023    Breast cancer screen  07/08/2024    Respiratory Syncytial Virus (RSV) Pregnant or age 60 yrs+ (1 - 1-dose 60+ series) Never done    Flu vaccine (1) 08/01/2024    A1C test (Diabetic or Prediabetic)  01/25/2025    Depression Screen  01/25/2025    Colorectal Cancer Screen  07/28/2032    Hepatitis A vaccine  Aged Out    Hepatitis B vaccine  Aged Out    Hib vaccine  Aged Out    Polio vaccine  Aged Out    Meningococcal (ACWY) vaccine  Aged Out

## 2024-08-09 NOTE — PROGRESS NOTES
Post-Discharge Transitional Care  Follow Up      Mary Beth Austin   YOB: 1965    Date of Office Visit:  8/9/2024  Date of Hospital Admission: 4/24/24  Date of Hospital Discharge: 4/24/24  Risk of hospital readmission (high >=14%. Medium >=10%) :No data recorded    Care management risk score Rising risk (score 2-5) and Complex Care (Scores >=6): No Risk Score On File     Non face to face  following discharge, date last encounter closed (first attempt may have been earlier): *No documented post hospital discharge outreach found in the last 14 days    Call initiated 2 business days of discharge: *No response recorded in the last 14 days    ASSESSMENT/PLAN:   Hospital discharge follow-up  -     [place] DISCHARGE MEDS RECONCILED W/ CURRENT OUTPATIENT MED LIST  Primary hypertension  -     DME Order for (Specify) as OP  -     hydroCHLOROthiazide 12.5 MG capsule; Take 1 capsule by mouth every morning, Disp-90 capsule, R-1Normal    Patient was seen in the ER on 8/7/2024 for angioedema of her lips secondary to lisinopril.  Patient is not a candidate for any ACE or ARB at this time.  Patient is to call the pharmacy to make sure she has an EpiPen on hand.  Patient was on lisinopril that hydrochlorothiazide combo.  We will resume just the hydrochlorothiazide portion and follow-up in 4 weeks to make sure blood pressure is doing okay.  Will do an also DME order for a blood pressure cuff for patient to check it at home.    Patient will also come back in 4 weeks to get her Pap done and diabetic checkup.    Mary Beth Austin was evaluated today and a DME order was entered for a BP cuff because she requires this to successfully complete daily living tasks of checking her blood pressure. A BP cuff is necessary due to the patient's hypertension and having medication changes as well. The need for this equipment was discussed with the patient and she understands and is in agreement.     Medical Decision Making: low

## 2024-08-09 NOTE — PROGRESS NOTES
I have reviewed and discussed [facility] including plan of care and follow up and agree with the care julisa plan. Must have Gen Surg referral for blood per rectum. Diagnosis Orders   1. Type 2 diabetes mellitus with other specified complication, without long-term current use of insulin (HCC)  metFORMIN (GLUCOPHAGE) 500 MG tablet    Microalbumin, Ur   2. Hemorrhoids, internal  [address]   3. Macrocytosis  Vitamin B12 & Folate   4. Screening for lipid disorders  Lipid Panel   5. Screening for diabetes mellitus (DM)  POCT glycosylated hemoglobin (Hb A1C)   6. Encounter for screening for HIV     7. Need for hepatitis C screening test     8.  Encounter for screening mammogram for malignant neoplasm of breast  CANDACE DIGITAL SCREEN W OR WO CAD BILATERAL Encounter for palliative care

## 2024-10-23 ENCOUNTER — TELEPHONE (OUTPATIENT)
Dept: FAMILY MEDICINE CLINIC | Age: 59
End: 2024-10-23

## 2024-10-23 NOTE — TELEPHONE ENCOUNTER
Writer reached out to the patient to reschedule her appointment on 11[facility]01/2024, [facility]m left to call the office to reschedule appointment. Patient's appointment cancelled at this time.

## 2025-01-20 ENCOUNTER — TRANSCRIBE ORDERS (OUTPATIENT)
Dept: ADMINISTRATIVE | Age: 60
End: 2025-01-20

## 2025-01-20 DIAGNOSIS — Z87.891 PERSONAL HISTORY OF TOBACCO USE, PRESENTING HAZARDS TO HEALTH: Primary | ICD-10-CM

## 2025-01-20 DIAGNOSIS — F17.210 CIGARETTE SMOKER: ICD-10-CM

## 2025-02-17 ENCOUNTER — HOSPITAL ENCOUNTER (EMERGENCY)
Age: 60
Discharge: HOME | End: 2025-02-17
Attending: EMERGENCY MEDICINE
Payer: COMMERCIAL

## 2025-02-17 VITALS
TEMPERATURE: 99.3 F | DIASTOLIC BLOOD PRESSURE: 88 MMHG | RESPIRATION RATE: 18 BRPM | HEART RATE: 91 BPM | OXYGEN SATURATION: 97 % | SYSTOLIC BLOOD PRESSURE: 168 MMHG

## 2025-02-17 DIAGNOSIS — K04.7 DENTAL ABSCESS: Primary | ICD-10-CM

## 2025-02-17 DIAGNOSIS — K08.89 PAIN, DENTAL: ICD-10-CM

## 2025-02-17 PROCEDURE — 99283 EMERGENCY DEPT VISIT LOW MDM: CPT

## 2025-02-17 RX ORDER — OXYCODONE AND ACETAMINOPHEN 5; 325 MG/1; MG/1
1 TABLET ORAL ONCE
Status: DISCONTINUED | OUTPATIENT
Start: 2025-02-17 | End: 2025-02-17 | Stop reason: HOSPADM

## 2025-02-17 RX ORDER — HYDROCODONE BITARTRATE AND ACETAMINOPHEN 5; 325 MG/1; MG/1
1 TABLET ORAL EVERY 4 HOURS PRN
Qty: 3 TABLET | Refills: 0 | Status: SHIPPED | OUTPATIENT
Start: 2025-02-17 | End: 2025-02-19

## 2025-02-17 ASSESSMENT — ENCOUNTER SYMPTOMS
SORE THROAT: 0
COUGH: 0
SHORTNESS OF BREATH: 0
ABDOMINAL PAIN: 0
RHINORRHEA: 0

## 2025-02-17 NOTE — ED PROVIDER NOTES
[facility] EMERGENCY DEPARTMENT  Emergency Department Encounter  Emergency Medicine Resident     Pt Name:Mary Beth Austin  MRN: [id number]  Birthdate 1965  Date of evaluation: 25  PCP:  No primary care provider on file.  Note Started: 11:50 AM EST    CHIEF COMPLAINT       Chief Complaint   Patient presents with    Dental Pain     HISTORY OF PRESENT ILLNESS  (Location/Symptom, Timing/Onset, Context/Setting, Quality, Duration, Modifying Factors, Severity.)      Mary Beth Austin is a 59 y.o. female who presents with complaints of right sided dental pain which has been going on for 1 week.  Patient complains of 10 out of 10 pain originating from a right upper tooth and states that she has a headache because of this.  Patient has a 20-pack-year smoking history and smokes 1 joint of marijuana per day.  Patient does not have a dentist that has not seen anyone recently.  Patient denies fevers, chills, chest pain, shortness of breath, sore throat at this time.    PAST MEDICAL / SURGICAL / SOCIAL / FAMILY HISTORY    has a past medical history of Diabetes mellitus (HCC), Snores, and Wellness examination.     has a past surgical history that includes  section (N/A); Colonoscopy (2022); and Colonoscopy (N/A, 2022).    Social History     Socioeconomic History    Marital status: Single     Spouse name: Not on file    Number of children: Not on file    Years of education: Not on file    Highest education level: Not on file   Occupational History    Not on file   Tobacco Use    Smoking status: Every Day     Current packs/day: 0.50     Average packs/day: 0.5 packs/day for 50.1 years (25.1 ttl pk-yrs)     Types: Cigarettes     Start date:      Passive exposure: Never    Smokeless tobacco: Never   Vaping Use    Vaping status: Never Used   Substance and Sexual Activity    Alcohol use: Yes     Alcohol/week: 6.0 standard drinks of alcohol     Types: 6 Cans of beer per week     Comment: 6

## 2025-02-17 NOTE — DISCHARGE INSTRUCTIONS
-Use Augmentin for 10 days as described in the prescirption  -Use Norco as needed for pain, total of 3 tablets provided  - Return to the ER if you develop fever, chills, significantly worsening pain, bleeding.

## 2025-02-17 NOTE — ED PROVIDER NOTES
[facility]     Emergency Department     Faculty Attestation    I performed a history and physical examination of the patient and discussed management with the resident. I reviewed the resident’s note and agree with the documented findings and plan of care. Any areas of disagreement are noted on the chart. I was personally present for the key portions of any procedures. I have documented in the chart those procedures where I was not present during the key portions. I have reviewed the emergency nurses triage note. I agree with the chief complaint, past medical history, past surgical history, allergies, medications, social and family history as documented unless otherwise noted below.        For Physician Assistant/ Nurse Practitioner cases/documentation I have personally evaluated this patient and have completed at least one if not all key elements of the E/M (history, physical exam, and MDM). Additional findings are as noted.  I have personally seen and evaluated the patient.  I find the patient's history and physical exam are consistent with the NP/PA documentation.  I agree with the care provided, treatment rendered, disposition and follow-up plan.    Right maxillary abscess without other complications at this time      Critical Care     Luis Shin M.D.  Attending Emergency  Physician           Luis Shin MD  02/17/25 1146

## 2025-02-17 NOTE — ED NOTES
Pt presented to ED ambulatory from triage.  Pt presents with C/O of dental pain .  Pt states the pain is on the top right side and has been going on for about a week and is getting worse pt does not have a dentist yet.  Pt denies any trauma .  Pt has no other C/O  Pt is Alert and oriented. Pt is resting comfortably on stretcher with call light in reach.  No acute distress noted. Respirations are even and unlabored.  White board updated. Will continue to follow plan of care.

## 2025-03-30 ENCOUNTER — APPOINTMENT (OUTPATIENT)
Dept: GENERAL RADIOLOGY | Age: 60
End: 2025-03-30
Payer: COMMERCIAL

## 2025-03-30 ENCOUNTER — HOSPITAL ENCOUNTER (EMERGENCY)
Age: 60
Discharge: HOME | End: 2025-03-30
Attending: EMERGENCY MEDICINE
Payer: COMMERCIAL

## 2025-03-30 VITALS
TEMPERATURE: 98.2 F | WEIGHT: 128 LBS | BODY MASS INDEX: 22.68 KG/M2 | DIASTOLIC BLOOD PRESSURE: 74 MMHG | SYSTOLIC BLOOD PRESSURE: 124 MMHG | OXYGEN SATURATION: 94 % | RESPIRATION RATE: 16 BRPM | HEART RATE: 99 BPM

## 2025-03-30 DIAGNOSIS — S52.601A CLOSED FRACTURE OF DISTAL END OF RIGHT ULNA, UNSPECIFIED FRACTURE MORPHOLOGY, INITIAL ENCOUNTER: Primary | ICD-10-CM

## 2025-03-30 PROCEDURE — 6370000000 HC RX 637 (ALT 250 FOR IP): Performed by: EMERGENCY MEDICINE

## 2025-03-30 PROCEDURE — 99283 EMERGENCY DEPT VISIT LOW MDM: CPT

## 2025-03-30 PROCEDURE — 73110 X-RAY EXAM OF WRIST: CPT

## 2025-03-30 RX ORDER — ACETAMINOPHEN 500 MG
1000 TABLET ORAL ONCE
Status: COMPLETED | OUTPATIENT
Start: 2025-03-30 | End: 2025-03-30

## 2025-03-30 RX ORDER — IBUPROFEN 600 MG/1
600 TABLET, FILM COATED ORAL EVERY 6 HOURS PRN
Qty: 20 TABLET | Refills: 0 | Status: SHIPPED | OUTPATIENT
Start: 2025-03-30 | End: 2025-04-04

## 2025-03-30 RX ORDER — ACETAMINOPHEN 500 MG
1000 TABLET ORAL EVERY 6 HOURS PRN
Qty: 42 TABLET | Refills: 0 | Status: SHIPPED | OUTPATIENT
Start: 2025-03-30 | End: 2025-04-04

## 2025-03-30 RX ORDER — IBUPROFEN 800 MG/1
800 TABLET, FILM COATED ORAL ONCE
Status: COMPLETED | OUTPATIENT
Start: 2025-03-30 | End: 2025-03-30

## 2025-03-30 RX ADMIN — ACETAMINOPHEN 1000 MG: 500 TABLET ORAL at 00:42

## 2025-03-30 RX ADMIN — IBUPROFEN 800 MG: 800 TABLET, FILM COATED ORAL at 00:42

## 2025-03-30 ASSESSMENT — PAIN SCALES - GENERAL
PAINLEVEL_OUTOF10: 8
PAINLEVEL_OUTOF10: 10
PAINLEVEL_OUTOF10: 8

## 2025-03-30 ASSESSMENT — PAIN - FUNCTIONAL ASSESSMENT: PAIN_FUNCTIONAL_ASSESSMENT: 0-10

## 2025-03-30 ASSESSMENT — PAIN DESCRIPTION - LOCATION
LOCATION: ARM
LOCATION: ARM

## 2025-03-30 ASSESSMENT — PAIN DESCRIPTION - ORIENTATION
ORIENTATION: RIGHT
ORIENTATION: RIGHT

## 2025-03-30 NOTE — DISCHARGE INSTRUCTIONS
You were seen here for right wrist pain.  X-ray showed an acute fracture of the distal ulnar.  You were placed in an ulnar gutter splint.  You need to leave the splint on and keep the splint dry.  Place a bag over the splint when showering.  Do not bear any weight to the right upper extremity.  You were given a prescription for Tylenol and Motrin.  Alternate between these as needed for pain.    You need to call and schedule follow-up appointment with an orthopedic surgeon as well as with your primary care doctor for soon as possible.    Return to the emergency department immediately if you experience worsening symptoms, develop any other symptoms, or if you have any other concerns.

## 2025-03-30 NOTE — ED NOTES
SW asked by RN to assist with providing transportation back to address on file of pt that has been discharged from the ED. Pt reports she is going to [address] Pt reporting continued pain in arm and was tearful and was offered to check back in but denied further need. RN reports pt ok to cab and is able to ambulate to and from a cab on her own without assistance and only has arm injury. Transportation arranged with [place]. Pt walked out to cab but was still reporting pain in arm. Pt was encouraged to check back in if she felt she needed to and pt refused.

## 2025-03-30 NOTE — ED PROVIDER NOTES
[facility] EMERGENCY DEPARTMENT     Emergency Department     Faculty Attestation    I performed a history and physical examination of the patient and discussed management with the resident. I reviewed the resident’s note and agree with the documented findings and plan of care. Any areas of disagreement are noted on the chart. I was personally present for the key portions of any procedures. I have documented in the chart those procedures where I was not present during the key portions. I have reviewed the emergency nurses triage note. I agree with the chief complaint, past medical history, past surgical history, allergies, medications, social and family history as documented unless otherwise noted below. For Physician Assistant/ Nurse Practitioner cases/documentation I have personally evaluated this patient and have completed at least one if not all key elements of the E/M (history, physical exam, and MDM). Additional findings are as noted.    Note Started: 12:20 AM EDT    Patient here with right arm pain.  She is right-handed.  Denies any injury or trauma but does admit to drinking alcohol this evening.  Does have a history of arthritis but does not follow with anyone for this.  On exam nonfocal tenderness over the right distal forearm no deformity strong pulses full range of motion of the shoulder elbow and wrist despite discomfort.  Intact radial ulnar nerve for motor and sensation.  Will image reevaluate probable discharge      Critical Care     none    Jonnie Graff MD, FACEP, FAAEM  Attending Emergency  Physician           Jonnie Graff MD  03/30/25 0051    
Faculty Sign-Out Attestation  Handoff taken on the following patient from prior Attending Physician: Prerna  Note Started: 1:16 AM EDT    I was available and discussed any additional care issues that arose and coordinated the management plans with the resident(s) caring for the patient during my duty period. Any areas of disagreement with resident’s documentation of care or procedures are noted on the chart. I was personally present for the key portions of any/all procedures during my duty period. I have documented in the chart those procedures where I was not present during the key portions.    59-year-old female presents to the emergency department with right wrist pain.  Denies injury.  Plain films of the right wrist are pending.    Medardo Mack DO  Attending Physician        Medardo Mack DO  03/30/25 0121    
3/30/25 4/4/25 Yes Safia Funk MD   hydroCHLOROthiazide 12.5 MG capsule Take 1 capsule by mouth every morning 8/9/24   Hillary Kumari MD   dicyclomine (BENTYL) 10 MG capsule Take 1 capsule by mouth every 6 hours as needed (cramps) 7/16/24 8/15/24  Hillary Kumari MD   metFORMIN (GLUCOPHAGE) 500 MG tablet take 1 tablet by mouth twice a day with meals 7/8/24   Hillary Kumari MD   Urea (CARMOL) 40 % cream Apply to callus 2 times per day in morning and night. 5/8/24 March Elena Manzo DPM   hydrocortisone (ANUSOL-HC) 2.5 % CREA rectal cream Apply to rectum twice daily 1/25/24   Hillary Kumari MD   famotidine (PEPCID) 10 MG tablet Take 1 tablet by mouth 2 times daily 1/25/24   Hillary Kumari MD   PARoxetine (PAXIL) 10 MG tablet Take 1 tablet by mouth at bedtime Take 1 tablet NIGHTLY 1/25/24   Hillary Kumari MD   melatonin 1 MG tablet Take 1 tablet by mouth nightly as needed for Sleep  Patient not taking: Reported on 8/9/2024 9/15/23   Stanton Hernández MD     REVIEW OF SYSTEMS       Review of Systems   Constitutional:  Negative for fever.   Respiratory:  Negative for shortness of breath.    Cardiovascular:  Negative for chest pain.   Gastrointestinal:  Negative for abdominal pain.       PHYSICAL EXAM      INITIAL VITALS:   /74   Pulse 99   Temp 98.2 °F (36.8 °C) (Oral)   Resp 16   Wt 58.1 kg (128 lb)   SpO2 94%   BMI 22.68 kg/m²     Physical Exam  Cardiovascular:      Rate and Rhythm: Normal rate and regular rhythm.   Pulmonary:      Effort: Pulmonary effort is normal.      Breath sounds: Normal breath sounds.   Abdominal:      General: There is no distension.      Palpations: Abdomen is soft.      Tenderness: There is no abdominal tenderness. There is no guarding or rebound.   Musculoskeletal:      Comments: Edema present over the ulnar/dorsal side of the right wrist.  Neurovascularly intact distally.   Skin:     General: Skin is warm.   Neurological:      Mental Status: She is alert

## 2025-03-30 NOTE — ED NOTES
Arrived patient to ED presents with right arm pain, patient states that arm pain started today, patient states she has a history of arthritis. Painscale of 10/10. Denies any trauma/injury. Alert and oriented. Able to follow commands. Bed on lowest position. Call light provided.

## 2025-04-16 ENCOUNTER — OFFICE VISIT (OUTPATIENT)
Dept: ORTHOPEDIC SURGERY | Age: 60
End: 2025-04-16
Payer: COMMERCIAL

## 2025-04-16 VITALS — HEIGHT: 63 IN | WEIGHT: 121 LBS | BODY MASS INDEX: 21.44 KG/M2

## 2025-04-16 DIAGNOSIS — S52.691A OTHER CLOSED FRACTURE OF DISTAL END OF RIGHT ULNA, INITIAL ENCOUNTER: Primary | ICD-10-CM

## 2025-04-16 DIAGNOSIS — R52 PAIN: ICD-10-CM

## 2025-04-16 PROCEDURE — 99203 OFFICE O/P NEW LOW 30 MIN: CPT | Performed by: STUDENT IN AN ORGANIZED HEALTH CARE EDUCATION/TRAINING PROGRAM

## 2025-04-16 RX ORDER — IBUPROFEN 800 MG/1
800 TABLET, FILM COATED ORAL 3 TIMES DAILY PRN
Qty: 270 TABLET | Refills: 1 | Status: SHIPPED | OUTPATIENT
Start: 2025-04-16

## 2025-04-16 RX ORDER — ACETAMINOPHEN 500 MG
500 TABLET ORAL 4 TIMES DAILY PRN
Qty: 120 TABLET | Refills: 0 | Status: SHIPPED | OUTPATIENT
Start: 2025-04-16

## 2025-04-16 NOTE — PROGRESS NOTES
[facility]  [address]  [place] [address]  Dept: [phone]    Ambulatory Orthopedic Consult    CHIEF COMPLAINT:    Chief Complaint   Patient presents with    New Patient     2025: ED : RT WRIST INJURY       HISTORY OF PRESENT ILLNESS:      The patient is a 59 y.o. right HD female who is being seen for consultation and evaluation of right wrist pain.  Patient is on 3/30/2025 was at home.  She lost footing while on the carpet and resulted in falling onto a chair.  She hit the ulnar aspect of her right wrist.  She had immediate pain.  She proceeded to the ER which obtain imaging that showed a distal ulnar fracture.  At that time they placed her in a ulnar gutter splint and told her to follow-up in our clinic.  At home she did get her splint wet.    Repeat imaging at today's visit does show some displacement of her fracture site when compared to the prior films.  Currently she does have pain and is asking for Tylenol.  She does state the pain radiates into her fingers.  She denies numbness or tingling.  She denies fever, chills, chest pain, shortness of breath.    Past Medical History:    Past Medical History:   Diagnosis Date    Diabetes mellitus (HCC)     Snores     Wellness examination     Maryjane  last seen 2022       Past Surgical History:    Past Surgical History:   Procedure Laterality Date     SECTION N/A     COLONOSCOPY  2022    COLONOSCOPY DIAGNOSTIC    COLONOSCOPY N/A 2022    COLONOSCOPY DIAGNOSTIC performed by Abe Samuels IV, DO at [facility] OR       Current Medications:   Current Outpatient Medications   Medication Sig Dispense Refill    acetaminophen (TYLENOL) 500 MG tablet Take 2 tablets by mouth every 6 hours as needed for Pain 42 tablet 0    ibuprofen (ADVIL;MOTRIN) 600 MG tablet Take 1 tablet by mouth every 6 hours as needed for Pain 20 tablet 0    hydroCHLOROthiazide 12.5 MG capsule Take

## (undated) DEVICE — SLEEVE COMPRESS UNIV KNEE SCD SEQUENTIAL